# Patient Record
Sex: MALE | Race: WHITE | NOT HISPANIC OR LATINO | Employment: FULL TIME | URBAN - NONMETROPOLITAN AREA
[De-identification: names, ages, dates, MRNs, and addresses within clinical notes are randomized per-mention and may not be internally consistent; named-entity substitution may affect disease eponyms.]

---

## 2017-01-04 ENCOUNTER — AMBULATORY - GICH (OUTPATIENT)
Dept: PHYSICAL THERAPY | Facility: OTHER | Age: 62
End: 2017-01-04

## 2017-01-04 DIAGNOSIS — M25.512 PAIN IN LEFT SHOULDER: ICD-10-CM

## 2017-01-11 ENCOUNTER — HOSPITAL ENCOUNTER (OUTPATIENT)
Dept: PHYSICAL THERAPY | Facility: OTHER | Age: 62
Setting detail: THERAPIES SERIES
End: 2017-01-11

## 2017-01-11 DIAGNOSIS — M25.512 PAIN IN LEFT SHOULDER: ICD-10-CM

## 2017-01-26 ENCOUNTER — HOSPITAL ENCOUNTER (OUTPATIENT)
Dept: PHYSICAL THERAPY | Facility: OTHER | Age: 62
Setting detail: THERAPIES SERIES
End: 2017-01-26

## 2017-02-01 ENCOUNTER — HOSPITAL ENCOUNTER (OUTPATIENT)
Dept: PHYSICAL THERAPY | Facility: OTHER | Age: 62
Setting detail: THERAPIES SERIES
End: 2017-02-01

## 2017-02-28 ENCOUNTER — HOSPITAL ENCOUNTER (EMERGENCY)
Facility: HOSPITAL | Age: 62
Discharge: HOME | End: 2017-02-28
Attending: PHYSICIAN ASSISTANT | Admitting: PHYSICIAN ASSISTANT
Payer: COMMERCIAL

## 2017-02-28 VITALS
TEMPERATURE: 98.1 F | OXYGEN SATURATION: 97 % | SYSTOLIC BLOOD PRESSURE: 154 MMHG | DIASTOLIC BLOOD PRESSURE: 99 MMHG | HEART RATE: 73 BPM | RESPIRATION RATE: 14 BRPM

## 2017-02-28 DIAGNOSIS — R07.89 ATYPICAL CHEST PAIN: ICD-10-CM

## 2017-02-28 LAB
ANION GAP SERPL CALCULATED.3IONS-SCNC: 7 MMOL/L (ref 3–14)
BASOPHILS # BLD AUTO: 0 10E9/L (ref 0–0.2)
BASOPHILS NFR BLD AUTO: 0.5 %
BUN SERPL-MCNC: 19 MG/DL (ref 7–30)
CALCIUM SERPL-MCNC: 8.4 MG/DL (ref 8.5–10.1)
CHLORIDE SERPL-SCNC: 106 MMOL/L (ref 94–109)
CO2 SERPL-SCNC: 28 MMOL/L (ref 20–32)
CREAT SERPL-MCNC: 0.99 MG/DL (ref 0.66–1.25)
DIFFERENTIAL METHOD BLD: NORMAL
EOSINOPHIL # BLD AUTO: 0.2 10E9/L (ref 0–0.7)
EOSINOPHIL NFR BLD AUTO: 2.7 %
ERYTHROCYTE [DISTWIDTH] IN BLOOD BY AUTOMATED COUNT: 13.2 % (ref 10–15)
GFR SERPL CREATININE-BSD FRML MDRD: 78 ML/MIN/1.7M2
GLUCOSE SERPL-MCNC: 124 MG/DL (ref 70–99)
HCT VFR BLD AUTO: 40.1 % (ref 40–53)
HGB BLD-MCNC: 13.5 G/DL (ref 13.3–17.7)
IMM GRANULOCYTES # BLD: 0 10E9/L (ref 0–0.4)
IMM GRANULOCYTES NFR BLD: 0.2 %
LYMPHOCYTES # BLD AUTO: 1.8 10E9/L (ref 0.8–5.3)
LYMPHOCYTES NFR BLD AUTO: 32.9 %
MCH RBC QN AUTO: 29.6 PG (ref 26.5–33)
MCHC RBC AUTO-ENTMCNC: 33.7 G/DL (ref 31.5–36.5)
MCV RBC AUTO: 88 FL (ref 78–100)
MONOCYTES # BLD AUTO: 0.6 10E9/L (ref 0–1.3)
MONOCYTES NFR BLD AUTO: 10.8 %
NEUTROPHILS # BLD AUTO: 2.9 10E9/L (ref 1.6–8.3)
NEUTROPHILS NFR BLD AUTO: 52.9 %
NRBC # BLD AUTO: 0 10*3/UL
NRBC BLD AUTO-RTO: 0 /100
PLATELET # BLD AUTO: 219 10E9/L (ref 150–450)
POTASSIUM SERPL-SCNC: 4 MMOL/L (ref 3.4–5.3)
RBC # BLD AUTO: 4.56 10E12/L (ref 4.4–5.9)
SODIUM SERPL-SCNC: 141 MMOL/L (ref 133–144)
TROPONIN I SERPL-MCNC: NORMAL UG/L (ref 0–0.04)
WBC # BLD AUTO: 5.5 10E9/L (ref 4–11)

## 2017-02-28 PROCEDURE — 80048 BASIC METABOLIC PNL TOTAL CA: CPT | Performed by: PHYSICIAN ASSISTANT

## 2017-02-28 PROCEDURE — 93005 ELECTROCARDIOGRAM TRACING: CPT

## 2017-02-28 PROCEDURE — 85025 COMPLETE CBC W/AUTO DIFF WBC: CPT | Performed by: PHYSICIAN ASSISTANT

## 2017-02-28 PROCEDURE — 36415 COLL VENOUS BLD VENIPUNCTURE: CPT | Performed by: PHYSICIAN ASSISTANT

## 2017-02-28 PROCEDURE — 99285 EMERGENCY DEPT VISIT HI MDM: CPT | Performed by: PHYSICIAN ASSISTANT

## 2017-02-28 PROCEDURE — 99285 EMERGENCY DEPT VISIT HI MDM: CPT | Mod: 25

## 2017-02-28 PROCEDURE — 71020 ZZHC CHEST TWO VIEWS, FRONT/LAT: CPT | Mod: TC

## 2017-02-28 PROCEDURE — 25000132 ZZH RX MED GY IP 250 OP 250 PS 637: Performed by: PHYSICIAN ASSISTANT

## 2017-02-28 PROCEDURE — 93010 ELECTROCARDIOGRAM REPORT: CPT | Performed by: INTERNAL MEDICINE

## 2017-02-28 PROCEDURE — 84484 ASSAY OF TROPONIN QUANT: CPT | Performed by: PHYSICIAN ASSISTANT

## 2017-02-28 RX ORDER — LIDOCAINE 40 MG/G
CREAM TOPICAL
COMMUNITY
End: 2020-06-23

## 2017-02-28 RX ORDER — ASPIRIN 81 MG/1
324 TABLET, CHEWABLE ORAL ONCE
Status: COMPLETED | OUTPATIENT
Start: 2017-02-28 | End: 2017-02-28

## 2017-02-28 RX ORDER — LORATADINE 10 MG/1
10 TABLET, ORALLY DISINTEGRATING ORAL DAILY
Status: ON HOLD | COMMUNITY
End: 2019-12-09

## 2017-02-28 RX ORDER — FLUTICASONE PROPIONATE 50 MCG
2 SPRAY, SUSPENSION (ML) NASAL DAILY
COMMUNITY

## 2017-02-28 RX ADMIN — ASPIRIN 81 MG 324 MG: 81 TABLET ORAL at 16:55

## 2017-02-28 ASSESSMENT — ENCOUNTER SYMPTOMS
NAUSEA: 0
FEVER: 0
VOMITING: 0
ABDOMINAL PAIN: 0
WHEEZING: 0
CHILLS: 0
SORE THROAT: 0
CHEST TIGHTNESS: 0
SHORTNESS OF BREATH: 0
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
## 2017-02-28 NOTE — ED AVS SNAPSHOT
[facility]    [address]    [place] [address]    Phone:  [phone]                                       Delvin Gray   MRN: [id number]    Department[facility]  [facility]   Date of Visit:  2/28/2017           After Visit Summary Signature Page     I have received my discharge instructions, and my questions have been answered. I have discussed any challenges I see with this plan with the nurse or doctor.    ..........................................................................................................................................  Patient/Patient Representative Signature      ..........................................................................................................................................  Patient Representative Print Name and Relationship to Patient    ..................................................               ................................................  Date                                            Time    ..........................................................................................................................................  Reviewed by Signature/Title    ...................................................              ..............................................  Date                                                            Time
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
## 2018-01-03 NOTE — PROGRESS NOTES
Patient Information     Patient Name MRN Sex Delvin Crum [id number] Male 1955      Progress Notes by Brenda Ac at 2017  4:31 PM     Author:  Brenda Ac Service:  (none) Author Type:  PT- Physical Therapy Assistant     Filed:  2017  5:25 PM Date of Service:  2017  4:31 PM Status:  Signed     :  Brenda Ac (PT- Physical Therapy Assistant)            [facility]  Outpatient PT - Daily Note      Date of Service: 2017    Visit 3 of 10     Patient Name: Delvin Gray   YOB: 1955   Referring MD/Provider: Gwendolyn Galvan  Diagnosis: LEFT SHOULDER PAIN, DECREASED ROM   Treatment Diagnosis: left shoulder pain, forced impingement/contusion of supra/infra tendons and bicepital tendonopathy  Insurance: Other: [employer]  Start of Care Date: 2017   Certification Dates: From: 2017  Re-Cert Due: 17      Subjective        Pain Ratin = Mild Pain, (Bothersome, Annoying, Irritating, Nagging) and  5 = Moderate Pain, (Aggravating, Grueling, Upsetting, Frustrating) / Location:  Left shoulder, anterior shoulder  Other:   Delvin reports he is working with smaller boxes now.     Functional Improvement:    Objective    Today's Intervention:    Pulleys stretch into flexion   TRX   Protraction x20    perurbations x20   TBand IR with green tband to fatigue (x35)   ER with red tband to fatigue (x30)  TRX   Wax on/off in chest press x30 seconds each direction    Retraction x20   scaption 2# to fatigue   8# bicep curl B x20   Hands on flat surface of BOSU placed on wall at 90 degrees of flexion with lateral weight shifts into unilateral protraction/retraction x20   Pushups with plus on flat surface of BOSU placed on wall at 90 degrees of flexion x10     Ionto 2.0mL of Dex at bicep tendon Action patch      Home Exercise Program:  Standing Shoulder Row with Anchored Resistance - 20 reps - 2-3 sets - 1x daily  Shoulder  Internal Rotation with Resistance - 20-30 reps - 2-3 sets - 1x daily  Shoulder External Rotation with Anchored Resistance - 20-30 reps - 2-3 sets - 3x weekly  Standing Shoulder Scaption - 20-30 reps - 2-3 sets - 3x weekly  Single Arm Doorway Pec Stretch at 90 Degrees Abduction - 3-5 reps - 2-3 sets - 15 hold - 3x weekly    Assessment    Therapist Assessment:    He does well with TherEx, flared up from work tasks, needs to reduce the strain with lifting less boxes 2 at a time.     Goals:  Patient goal: Work and sleep and do house chores with no pain or limits in 8 weeks.     Functional Short Term Goals (4 weeks):      Patient will have full ROM in all directions and reaches.  Patient will have 5/5 cuff strength.  Patient is to have improved work tolerance of lifting boxes to 50% improved.  Patient will have improved sleep position tolerance with 75% improved.     Functional Long Term Goals (8 weeks):      Patient will be independent in their Home Exercise Program without exacerbation of symptoms.  Patient will have no pain with sleeping  Patient will tolerate all work tasks.  Patient will complete posture correction and lifting techniques to prevent future injury.  Patient is to self-manage symptoms and return to prior level of function in 8 weeks.         Patient participated in goal selection and understand(s) the plan of care: Yes   Patient Potential for Achieving Desired Outcome:  Good, strong and healthy     Response to Intervention: Patient had good response and is in understanding of plan of care and home exercise program at this time.        Plan     Treatment Plan / Targeted Outcomes:  Frequency: 16 visits  Duration of Treatment: 3 months     Planned Interventions:   Home Exercise Program development  Therapeutic Exercise (ROM & Strengthening)  Therapeutic Activities  Manual Therapy  Neuromuscular Re-education  Gait Training  Ultrasound  E-Stim/TENS  Phonophoresis with Ketoprofen  Iontophoresis with  Dexamethasone  Warm/Cold Pack  Vasopneumatic Compression with Cold Therapy ( [employer] )     Plan for next visit: scap retraction work, ionto     Student or PTA has been instructed in and demonstrates skills necessary to carry out above stated treatment plan: Yes     Thank you for your referral to [facility]. Please call with any questions, concerns or comments. [phone]

## 2018-01-03 NOTE — PROGRESS NOTES
Patient Information     Patient Name MRN Sex Delvin Crum [id number] Male 1955      Progress Notes by Mika Lee, PT at 2017  3:38 PM     Author:  Mika Lee PT Service:  (none) Author Type:  PT- Physical Therapist     Filed:  2017  8:24 AM Date of Service:  2017  3:38 PM Status:  Signed     :  Mika Lee PT (PT- Physical Therapist)            [facility]  Outpatient PT - Daily Note      Date of Service: 2017   Visit 2 of 10     Patient Name: Delvin Gray   YOB: 1955   Referring MD/Provider: Gwendolyn Galvan  Diagnosis: LEFT SHOULDER PAIN, DECREASED ROM   Treatment Diagnosis: left shoulder pain, forced impingement/contusion of supra/infra tendons and bicepital tendonopathy  Insurance: Other: [employer]  Start of Care Date: 2017   Certification Dates: From: 2017  Re-Cert Due: 17      Subjective        Pain Ratin = Mild Pain, (Bothersome, Annoying, Irritating, Nagging) and  5 = Moderate Pain, (Aggravating, Grueling, Upsetting, Frustrating) / Location:  Left shoulder, anterior shoulder  Other:   Has been doing ok with work tasks, pain with lifting boxes, especially with 2 boxes.    Functional Improvement:    Objective    Today's Intervention:    Pulleys stretch  TRX   Protraction   perurbations  TBand IR   ER  TRX   Wax on/off in chest press, and in row position  scaption 2# to fatigue  8# bicep curl  Ionto 2.0mL of Dex at bicep tendon. Action patch      Home Exercise Program:    Standing Shoulder Row with Anchored Resistance - 20 reps - 2-3 sets - 1x daily  Shoulder Internal Rotation with Resistance - 20-30 reps - 2-3 sets - 1x daily  Shoulder External Rotation with Anchored Resistance - 20-30 reps - 2-3 sets - 3x weekly  Standing Shoulder Scaption - 20-30 reps - 2-3 sets - 3x weekly  Single Arm Doorway Pec Stretch at 90 Degrees Abduction - 3-5 reps - 2-3 sets - 15 hold - 3x  weekly    Assessment    Therapist Assessment:    He does well with TherEx, flared up from work tasks, needs to reduce the strain with lifting less boxes 2 at a time.     Goals:  Exercises  Standing Shoulder Row with Anchored Resistance - 20 reps - 2-3 sets - 1x daily  Shoulder Internal Rotation with Resistance - 20-30 reps - 2-3 sets - 1x daily  Shoulder External Rotation with Anchored Resistance - 20-30 reps - 2-3 sets - 3x weekly  Standing Shoulder Scaption - 20-30 reps - 2-3 sets - 3x weekly  Single Arm Doorway Pec Stretch at 90 Degrees Abduction - 3-5 reps - 2-3 sets - 15 hold - 3x weekly       Response to Intervention:  Tired in shoulder.       Plan    Treatment Plan / Targeted Outcomes:     Frequency:   16 visits     Duration of Treatment: 3 months    Planned Interventions:    Home Exercise Program development  Therapeutic Exercise (ROM & Strengthening)  Therapeutic Activities  Manual Therapy  Neuromuscular Re-education  Ultrasound  Electrical Stimulation  Phonophoresis with Ketoprofen  Iontophoresis with Dexamethasone  Warm/Cold Pack  Vasopneumatic Device    Plan for next visit:  scap retraction work, ionto    Student or PTA has been instructed in and demonstrates skills necessary to carry out above stated treatment plan: Yes    Thank you for your referral to [facility].  Please call with any questions, concerns or comments.  [phone]

## 2018-02-01 ENCOUNTER — DOCUMENTATION ONLY (OUTPATIENT)
Dept: FAMILY MEDICINE | Facility: OTHER | Age: 63
End: 2018-02-01

## 2018-02-01 RX ORDER — FLUTICASONE PROPIONATE 50 MCG
1 SPRAY, SUSPENSION (ML) NASAL DAILY
Status: ON HOLD | COMMUNITY
Start: 2012-08-30 | End: 2019-12-09 | Stop reason: DRUGHIGH

## 2018-02-01 RX ORDER — NAPROXEN 500 MG/1
500 TABLET ORAL 2 TIMES DAILY WITH MEALS
Status: ON HOLD | COMMUNITY
Start: 2012-08-30 | End: 2019-12-10

## 2018-02-01 RX ORDER — GABAPENTIN 300 MG/1
300 CAPSULE ORAL 3 TIMES DAILY
Status: ON HOLD | COMMUNITY
End: 2019-12-09 | Stop reason: DRUGHIGH

## 2018-02-01 RX ORDER — OXYCODONE AND ACETAMINOPHEN 5; 325 MG/1; MG/1
1-2 TABLET ORAL EVERY 6 HOURS PRN
COMMUNITY
Start: 2015-07-24 | End: 2019-09-16

## 2018-02-01 RX ORDER — POLYETHYLENE GLYCOL 3350 17 G/17G
17 POWDER, FOR SOLUTION ORAL DAILY PRN
COMMUNITY
Start: 2012-08-30 | End: 2024-03-21

## 2018-02-01 RX ORDER — ROPINIROLE 1 MG/1
1 TABLET, FILM COATED ORAL DAILY PRN
COMMUNITY

## 2018-02-01 RX ORDER — LORATADINE 10 MG/1
10 TABLET ORAL DAILY
COMMUNITY
Start: 2012-08-30

## 2018-04-08 NOTE — DISCHARGE INSTRUCTIONS
[facility] Hospitalist Progress Note    Date of Service: 4/7/2018    Chief Complaint  60 y.o. male admitted 3/16/2018 with ongoing cellulitis of bilateral lower extremities with a history of chronic venous stasis and lymphedema. He had acute respiratory distress in ER. S/p ICU stay with MV dependence this hospitalization with treatment for pneumonia and cellulitis.     Interval Problem Update  Patient seen and evaluated on rounds  Feels well  Wants opioid escalated, counseled and educated regarding risks  Remains non motivated. Lack of medical compliance.   Non compliant with nursing intervention, medications use  Counseled and educated.   SW arranging home BIPAP, RUSSELL for caregiver  Weaning Oxycodone, continue current dose for now  Advised if HCO3 improved then can be transitioned back to baseline  Overall difficult disposition    Consultants/Specialty  Pulmonology   Palliative care    Disposition  Refused by LTACs  Refused by SNFs  [facility] to do RUSSELL for home caregiver  Arrange home health care  Arrange BIPAP for use at home  Once above arranged can discharge home  Can transfer to medical RNF while disposition is awaited, no telemetry needs at this time      Review of Systems   Constitutional: Positive for malaise/fatigue. Negative for chills, diaphoresis, fever and weight loss.   HENT: Negative for hearing loss and tinnitus.    Eyes: Negative for blurred vision and double vision.   Respiratory: Positive for shortness of breath. Negative for cough, hemoptysis, sputum production and wheezing.    Cardiovascular: Positive for leg swelling. Negative for chest pain and claudication.   Gastrointestinal: Positive for constipation. Negative for abdominal pain, blood in stool, diarrhea, heartburn, melena, nausea and vomiting.   Genitourinary: Negative for dysuria, flank pain, frequency, hematuria and urgency.   Musculoskeletal: Positive for joint pain. Negative for back pain, falls, myalgias and neck pain.   Skin: Positive for  Your cardiac work up today was normal. I believe the cause of your chest pain is likely due to the smoke exposure you had on 3/16. Your symptoms should continue to improve from here. Please return here with any new or worsening symptoms.      *CHEST PAIN, UNCERTAIN CAUSE    Based on your exam today, the exact cause of your chest pain is not certain. Your condition does not seem serious at this time, and your pain does not appear to be coming from your heart. However, sometimes the signs of a serious problem take more time to appear. Therefore, watch for the warning signs listed below.  HOME CARE:  1. Rest today and avoid strenuous activity.  2. Take any prescribed medicine as directed.  FOLLOW UP with your doctor in 1-3 days.   GET PROMPT MEDICAL ATTENTION if any of the following occur:    A change in the type of pain: if it feels different, becomes more severe, lasts longer, or begins to spread into your shoulder, arm, neck, jaw or back    Shortness of breath or increased pain with breathing    Weakness, dizziness, or fainting    Cough with blood or dark colored sputum (phlegm)    Fever over 101  F (38.3  C)    Swelling, pain or redness in one leg    [id number] [address], [place] [address]. All rights reserved. This information is not intended as a substitute for professional medical care. Always follow your healthcare professional's instructions.     rash. Negative for itching.   Neurological: Positive for weakness. Negative for dizziness and headaches.   Psychiatric/Behavioral: Negative for depression. The patient is nervous/anxious.       Physical Exam  Laboratory/Imaging   Hemodynamics  Temp (24hrs), Av.4 °C (97.5 °F), Min:36.1 °C (96.9 °F), Max:36.7 °C (98 °F)   Temperature: 36.7 °C (98 °F)  Pulse  Av.6  Min: 54  Max: 116    Blood Pressure: (!) 94/52      Respiratory      Respiration: 18, Pulse Oximetry: 91 %     Work Of Breathing / Effort: Mild  RUL Breath Sounds: Clear, RML Breath Sounds: Diminished, RLL Breath Sounds: Diminished, LARY Breath Sounds: Clear, LLL Breath Sounds: Diminished    Fluids    Intake/Output Summary (Last 24 hours) at 18  Last data filed at 18 1700   Gross per 24 hour   Intake              860 ml   Output             2300 ml   Net            -1440 ml       Nutrition  Orders Placed This Encounter   Procedures   • DIET ORDER     Standing Status:   Standing     Number of Occurrences:   1     Order Specific Question:   Diet:     Answer:   Diabetic [3]     Order Specific Question:   Texture/Fiber modifications:     Answer:   Dysphagia 3(Mechanical Soft)specify fluid consistency(question 6) [3]     Order Specific Question:   Consistency/Fluid modifications:     Answer:   Thin Liquids [3]     Comments:   NO straws     Physical Exam   Constitutional: He is oriented to person, place, and time. He appears well-developed. No distress.   Morbidly obese, Body mass index is 42.97 kg/m².   HENT:   Head: Normocephalic.   Mouth/Throat: Oropharynx is clear and moist. No oropharyngeal exudate.   Eyes: Conjunctivae are normal. Pupils are equal, round, and reactive to light. No scleral icterus.   Neck: No JVD present.   Cardiovascular: Normal rate, regular rhythm and normal heart sounds.  Exam reveals no gallop and no friction rub.    No murmur heard.  Tachycardia   Pulmonary/Chest: No stridor. No respiratory distress. He has  decreased breath sounds. He has no wheezes. He has no rhonchi. He has no rales. He exhibits no crepitus.   Diminished in bases. Poor inspiratory effort   Abdominal: Soft. Bowel sounds are normal. He exhibits distension. There is no tenderness. There is no rebound and no guarding.   Musculoskeletal: He exhibits edema (2+ LE). He exhibits no tenderness or deformity.   Neurological: He is alert and oriented to person, place, and time. No cranial nerve deficit.   Skin: Skin is warm and dry. He is not diaphoretic.   Scaly skin on head  Lower extremity wounds  B/L Severe stasis dermatitis   Psychiatric: He has a normal mood and affect. His behavior is normal. Judgment and thought content normal.   Vitals reviewed.          Recent Labs      04/05/18   0306  04/06/18   0239   SODIUM  138  137   POTASSIUM  4.4  4.8   CHLORIDE  91*  91*   CO2  39*  39*   GLUCOSE  135*  139*   BUN  23*  30*   CREATININE  0.62  0.81   CALCIUM  9.7  9.5                      Assessment/Plan     * Acute on chronic respiratory failure with hypoxia and hypercapnia (CMS-HCC)- (present on admission)   Assessment & Plan    Chronic issues, nearly baseline now   Intubated 3-20 extubated 3/25/2018 this hospital stay  Morbid obesity, Body mass index is 42.97 kg/m².  Obesity hypoventilation syndrome / ALIREZA is contributing   Suspect underlying pulmonary hypertension / RV dysfunction  Underlying history of COPD  Non compliance with intervention / medical recommendations & therapy complicates care  Underlying debility with poor inspiratory effort  Narcotic dependence further decreasing inspiratory efforts    Plan:  At least 8 hours of BIPAP therapy at night  Recommend BIPAP twice daily for 1 hours (11am-12 pm) & (3pm-4pm)  Aggressive nursing interventions, RT protocol  Ambulate as able  Continue Lasix, PO 40 daily  Monitor HCO3 on chemistries   Symbicort / Spiriva  Wean off opioids as these are not helping him  Nocturnal desaturation study done, have pulm  request outpatient BIPAP  Arrange BIPAP for home    Poor prognosis 2/2 multiple factors listed above most importantly 2/2 patient's personal non compliance and lack of motivation         Severe protein-calorie malnutrition (CMS-HCC)   Assessment & Plan    Evident from SC fat loss and pre albumin levels  Optimize nutrition        Bilateral edema of lower extremity- (present on admission)   Assessment & Plan    Chronic lymphedema and stasis dermatitis  Continue po Lasix  Monitor intake output and fluid status  Ambulation as able  Significant contribution from debility        Diabetes type 2, controlled (CMS-HCC)- (present on admission)   Assessment & Plan    No apparent manifestations  Metformin 1000 mg BID  ASA 81, Atorvastatin 40 mg (check lipid profile)        Non compliance w medication regimen- (present on admission)   Assessment & Plan    Reinforced compliance   Patient has also been noncompliant with BIPAP as he does not like the feeling  Counseled and educated again  Improving compliance now  Will continue to reinforce care        Narcotic addiction (CMS-HCC)- (present on admission)   Assessment & Plan    Mnimize sedating agents  Wean as not helping respiratory status        Stasis dermatitis- (present on admission)   Assessment & Plan    With MSSA cellulitis this hospital course   Completed antibiotic course   Continue wound care / Skin care  Aggressive nursing interventions        Hypothyroidism- (present on admission)   Assessment & Plan    Synthroid, TSH wnl 03/2018        COPD (chronic obstructive pulmonary disease) (CMS-HCC)- (present on admission)   Assessment & Plan    RT protocol  Pulm on board  No acute exacerbation at this time        HTN (hypertension)- (present on admission)   Assessment & Plan    Stop amlodipine as this will contribute to worsening edema  Lisinopril 20 mg  Titrate therapy as clinically appropriate        Morbid obesity (CMS-HCC)- (present on admission)   Assessment & Plan    Body  mass index is 48.02 kg/m².   Complicated by obstructive sleep apnea and obesity hypoventilation syndrome  Encouraged weight loss          Quality-Core Measures   Reviewed items::  Labs reviewed and Medications reviewed  DVT prophylaxis pharmacological::  Enoxaparin (Lovenox)  Antibiotics:  Treating active infection/contamination beyond 24 hours perioperative coverage

## 2018-05-03 DIAGNOSIS — M19.91 PRIMARY OSTEOARTHRITIS: Primary | ICD-10-CM

## 2018-05-31 ENCOUNTER — HOSPITAL ENCOUNTER (OUTPATIENT)
Dept: PHYSICAL THERAPY | Facility: OTHER | Age: 63
Setting detail: THERAPIES SERIES
End: 2018-05-31
Attending: NURSE PRACTITIONER
Payer: COMMERCIAL

## 2018-05-31 PROCEDURE — 97110 THERAPEUTIC EXERCISES: CPT | Mod: GP | Performed by: PHYSICAL THERAPIST

## 2018-05-31 PROCEDURE — 40000185 ZZHC STATISTIC PT OUTPT VISIT: Performed by: PHYSICAL THERAPIST

## 2018-05-31 PROCEDURE — 97161 PT EVAL LOW COMPLEX 20 MIN: CPT | Mod: GP | Performed by: PHYSICAL THERAPIST

## 2018-05-31 NOTE — PROGRESS NOTES
05/31/18 1334   General Information   Type of Visit Initial OP Ortho PT Evaluation   Start of Care Date 05/31/18   Referring Physician Dr. Jen Galvan   Patient/Family Goals Statement Decrease neck pain and shoulder pain   Insurance Type Other  ([facility])   Insurance Comments/Visits Authorized [facility] authorized 14 visits   Medical Diagnosis OA neck   Surgical/Medical history reviewed Yes   Presentation and Etiology   Pertinent history of current problem (include personal factors and/or comorbidities that impact the POC) low   Functional Limitations perform required work activities   Symptom Location lower neck/upper thoracic, upper neck and occiput, denies headaches, reports upper neck cramping.   How/Where did it occur With repetition/overuse   Onset date of current episode/exacerbation 11/01/17   Chronicity Recurrent   Pain rating (0-10 point scale) Best (/10);Worst (/10)   Best (/10) 2   Worst (/10) 7   Pain quality C. Aching;G. Cramping;B. Dull   Frequency of pain/symptoms B. Intermittent   Pain/symptoms exacerbated by C. Lifting;H. Overhead reach;G. Certain positions;L. Work tasks   Prior Level of Function   Functional Level Prior Comment Since November has had more ongoing neck pain, left shoulder pain, bilateral forearm and hand pain.  Prior to that had mild symptoms.   Fall Risk Screen   Fall screen completed by PT   Have you fallen 2 or more times in the past year? No   Have you fallen and had an injury in the past year? No   Is patient a fall risk? No   Cervical Spine   Observation fair to poor neck posture, forward head postion by 10 degrees   Posture Cervical spine ROM measured with CROM   Cervical Flexion ROM 44   Cervical Extension ROM 60   Cervical Right Side Bending ROM 30   Cervical Left Side Bending ROM 34   Cervical Right Rotation ROM 62   Cervical Left Rotation ROM 50   Shoulder AROM Screen limited abduction to 130 degrees bilaterally   Shoulder Shrug (C2-C4) Strength 5/5   Shoulder Abd (C5)  Strength 4/5   Shoulder Add (C7) Strength 5/5   Shoulder ER (C5, C6) Strength left 4/5, right 5/5   Shoulder IR (C5, C6) Strength 5/5   Elbow Flexion (C5, C6) Strength 5/5   Elbow Extension (C7) Strength 5/5   Wrist Extension (C6) Strength left 4/5, right 5/5   Wrist Flexion (C7) Strength left 4/5, right 5/5   Shoulder/Wrist/Hand Strength Comments  tested with hand dynamometer:  dominant right hand weakness observed 58 lbs, left hand weakness observed 48 lbs.  History of carpal tunnel syndrome bilaterally.   Planned Therapy Interventions   Planned Therapy Interventions neuromuscular re-education;manual therapy;strengthening;stretching;joint mobilization   Planned Modality Interventions   Planned Modality Interventions Cryotherapy;Electrical stimulation;Ultrasound;Traction;Iontophoresis   Clinical Impression   Criteria for Skilled Therapeutic Interventions Met yes, treatment indicated   PT Diagnosis Neck and left shoulder pain, decreased ROM, shoulder and hand weakness.   Functional limitations due to impairments Difficulty grasping, lifting, carrying, reaching over shoulder height    Clinical Presentation Evolving/Changing   Clinical Decision Making (Complexity) Low complexity   Therapy Frequency 2 times/Week   Predicted Duration of Therapy Intervention (days/wks) 8 weeks   Risk & Benefits of therapy have been explained Yes   Patient, Family & other staff in agreement with plan of care Yes   Ortho Goal 1   Goal Identifier Sleep   Goal Description Sleep through the night with no sleep disruption due to pain.   Target Date 07/26/18   Ortho Goal 2   Goal Identifier Packing boxes at work   Goal Description Patient will tolerate 6-8 hours of work rating pain less then 3/10 consistently.   Target Date 07/26/18   Ortho Goal 3   Goal Identifier HEP   Goal Description Independent and compliant with advanced HEP   Target Date 07/26/18   Total Evaluation Time   Total Evaluation Time 40

## 2018-06-04 ENCOUNTER — HOSPITAL ENCOUNTER (OUTPATIENT)
Dept: PHYSICAL THERAPY | Facility: OTHER | Age: 63
Setting detail: THERAPIES SERIES
End: 2018-06-04
Payer: COMMERCIAL

## 2018-06-04 PROCEDURE — 97012 MECHANICAL TRACTION THERAPY: CPT | Mod: GP | Performed by: PHYSICAL THERAPIST

## 2018-06-04 PROCEDURE — 97112 NEUROMUSCULAR REEDUCATION: CPT | Mod: GP | Performed by: PHYSICAL THERAPIST

## 2018-06-04 PROCEDURE — 97110 THERAPEUTIC EXERCISES: CPT | Mod: GP | Performed by: PHYSICAL THERAPIST

## 2018-06-04 PROCEDURE — 40000185 ZZHC STATISTIC PT OUTPT VISIT: Performed by: PHYSICAL THERAPIST

## 2018-06-06 ENCOUNTER — HOSPITAL ENCOUNTER (OUTPATIENT)
Dept: PHYSICAL THERAPY | Facility: OTHER | Age: 63
Setting detail: THERAPIES SERIES
End: 2018-06-06
Payer: COMMERCIAL

## 2018-06-06 PROCEDURE — 40000185 ZZHC STATISTIC PT OUTPT VISIT: Performed by: PHYSICAL THERAPIST

## 2018-06-06 PROCEDURE — 97140 MANUAL THERAPY 1/> REGIONS: CPT | Mod: GP | Performed by: PHYSICAL THERAPIST

## 2018-06-06 PROCEDURE — 97110 THERAPEUTIC EXERCISES: CPT | Mod: GP | Performed by: PHYSICAL THERAPIST

## 2018-06-11 ENCOUNTER — HOSPITAL ENCOUNTER (OUTPATIENT)
Dept: PHYSICAL THERAPY | Facility: OTHER | Age: 63
Setting detail: THERAPIES SERIES
End: 2018-06-11
Payer: COMMERCIAL

## 2018-06-11 PROCEDURE — 97140 MANUAL THERAPY 1/> REGIONS: CPT | Mod: GP | Performed by: PHYSICAL THERAPIST

## 2018-06-11 PROCEDURE — 40000185 ZZHC STATISTIC PT OUTPT VISIT: Performed by: PHYSICAL THERAPIST

## 2018-06-11 PROCEDURE — 97110 THERAPEUTIC EXERCISES: CPT | Mod: GP | Performed by: PHYSICAL THERAPIST

## 2018-06-11 PROCEDURE — 97012 MECHANICAL TRACTION THERAPY: CPT | Mod: GP | Performed by: PHYSICAL THERAPIST

## 2018-06-21 ENCOUNTER — HOSPITAL ENCOUNTER (OUTPATIENT)
Dept: PHYSICAL THERAPY | Facility: OTHER | Age: 63
Setting detail: THERAPIES SERIES
End: 2018-06-21
Payer: COMMERCIAL

## 2018-06-21 PROCEDURE — 97140 MANUAL THERAPY 1/> REGIONS: CPT | Mod: GP,XU

## 2018-06-21 PROCEDURE — 40000185 ZZHC STATISTIC PT OUTPT VISIT

## 2018-06-21 PROCEDURE — 97012 MECHANICAL TRACTION THERAPY: CPT | Mod: GP

## 2018-06-21 PROCEDURE — 97110 THERAPEUTIC EXERCISES: CPT | Mod: GP

## 2018-06-28 ENCOUNTER — HOSPITAL ENCOUNTER (OUTPATIENT)
Dept: PHYSICAL THERAPY | Facility: OTHER | Age: 63
Setting detail: THERAPIES SERIES
End: 2018-06-28
Attending: NURSE PRACTITIONER
Payer: COMMERCIAL

## 2018-06-28 PROCEDURE — 97140 MANUAL THERAPY 1/> REGIONS: CPT | Mod: GP,XU

## 2018-06-28 PROCEDURE — 97012 MECHANICAL TRACTION THERAPY: CPT | Mod: GP

## 2018-06-28 PROCEDURE — 40000185 ZZHC STATISTIC PT OUTPT VISIT

## 2018-06-28 PROCEDURE — 97110 THERAPEUTIC EXERCISES: CPT | Mod: GP

## 2018-07-02 ENCOUNTER — HOSPITAL ENCOUNTER (OUTPATIENT)
Dept: PHYSICAL THERAPY | Facility: OTHER | Age: 63
Setting detail: THERAPIES SERIES
End: 2018-07-02
Payer: COMMERCIAL

## 2018-07-02 PROCEDURE — 40000185 ZZHC STATISTIC PT OUTPT VISIT: Performed by: PHYSICAL THERAPIST

## 2018-07-02 PROCEDURE — 97012 MECHANICAL TRACTION THERAPY: CPT | Mod: GP | Performed by: PHYSICAL THERAPIST

## 2018-07-02 PROCEDURE — 97140 MANUAL THERAPY 1/> REGIONS: CPT | Mod: GP | Performed by: PHYSICAL THERAPIST

## 2018-07-02 PROCEDURE — 97110 THERAPEUTIC EXERCISES: CPT | Mod: GP | Performed by: PHYSICAL THERAPIST

## 2018-07-03 ENCOUNTER — HOSPITAL ENCOUNTER (OUTPATIENT)
Dept: PHYSICAL THERAPY | Facility: OTHER | Age: 63
Setting detail: THERAPIES SERIES
End: 2018-07-03
Attending: NURSE PRACTITIONER
Payer: COMMERCIAL

## 2018-07-03 PROCEDURE — 97012 MECHANICAL TRACTION THERAPY: CPT | Mod: GP | Performed by: PHYSICAL THERAPIST

## 2018-07-03 PROCEDURE — 97110 THERAPEUTIC EXERCISES: CPT | Mod: GP | Performed by: PHYSICAL THERAPIST

## 2018-07-03 PROCEDURE — 40000185 ZZHC STATISTIC PT OUTPT VISIT: Performed by: PHYSICAL THERAPIST

## 2018-07-03 PROCEDURE — 97140 MANUAL THERAPY 1/> REGIONS: CPT | Mod: GP | Performed by: PHYSICAL THERAPIST

## 2018-07-09 ENCOUNTER — HOSPITAL ENCOUNTER (OUTPATIENT)
Dept: PHYSICAL THERAPY | Facility: OTHER | Age: 63
Setting detail: THERAPIES SERIES
End: 2018-07-09
Attending: NURSE PRACTITIONER
Payer: COMMERCIAL

## 2018-07-09 PROCEDURE — 97012 MECHANICAL TRACTION THERAPY: CPT | Mod: GP | Performed by: PHYSICAL THERAPIST

## 2018-07-09 PROCEDURE — 40000185 ZZHC STATISTIC PT OUTPT VISIT: Performed by: PHYSICAL THERAPIST

## 2018-07-09 PROCEDURE — 97110 THERAPEUTIC EXERCISES: CPT | Mod: GP | Performed by: PHYSICAL THERAPIST

## 2018-07-09 PROCEDURE — 97112 NEUROMUSCULAR REEDUCATION: CPT | Mod: GP | Performed by: PHYSICAL THERAPIST

## 2018-07-18 ENCOUNTER — APPOINTMENT (OUTPATIENT)
Dept: GENERAL RADIOLOGY | Facility: OTHER | Age: 63
End: 2018-07-18
Attending: FAMILY MEDICINE
Payer: OTHER MISCELLANEOUS

## 2018-07-18 ENCOUNTER — HOSPITAL ENCOUNTER (EMERGENCY)
Facility: OTHER | Age: 63
Discharge: HOME | End: 2018-07-18
Attending: FAMILY MEDICINE | Admitting: FAMILY MEDICINE
Payer: OTHER MISCELLANEOUS

## 2018-07-18 VITALS
HEIGHT: 65 IN | DIASTOLIC BLOOD PRESSURE: 92 MMHG | TEMPERATURE: 98.5 F | OXYGEN SATURATION: 94 % | WEIGHT: 227 LBS | SYSTOLIC BLOOD PRESSURE: 165 MMHG | BODY MASS INDEX: 37.82 KG/M2 | RESPIRATION RATE: 16 BRPM | HEART RATE: 78 BPM

## 2018-07-18 DIAGNOSIS — S61.218A LACERATION OF INDEX FINGER WITHOUT FOREIGN BODY WITHOUT DAMAGE TO NAIL, UNSPECIFIED LATERALITY, INITIAL ENCOUNTER: ICD-10-CM

## 2018-07-18 LAB
ANION GAP SERPL CALCULATED.3IONS-SCNC: 8 MMOL/L (ref 3–14)
BUN SERPL-MCNC: 22 MG/DL (ref 7–25)
CALCIUM SERPL-MCNC: 9.4 MG/DL (ref 8.6–10.3)
CHLORIDE SERPL-SCNC: 106 MMOL/L (ref 98–107)
CO2 SERPL-SCNC: 26 MMOL/L (ref 21–31)
CREAT SERPL-MCNC: 1.11 MG/DL (ref 0.7–1.3)
GFR SERPL CREATININE-BSD FRML MDRD: 68 ML/MIN/1.7M2
GLUCOSE SERPL-MCNC: 94 MG/DL (ref 70–105)
POTASSIUM SERPL-SCNC: 4.3 MMOL/L (ref 3.5–5.1)
SODIUM SERPL-SCNC: 140 MMOL/L (ref 134–144)

## 2018-07-18 PROCEDURE — 73140 X-RAY EXAM OF FINGER(S): CPT | Mod: LT

## 2018-07-18 PROCEDURE — 25000125 ZZHC RX 250: Performed by: FAMILY MEDICINE

## 2018-07-18 PROCEDURE — 12002 RPR S/N/AX/GEN/TRNK2.6-7.5CM: CPT | Mod: Z6 | Performed by: FAMILY MEDICINE

## 2018-07-18 PROCEDURE — 90471 IMMUNIZATION ADMIN: CPT | Performed by: FAMILY MEDICINE

## 2018-07-18 PROCEDURE — 25000132 ZZH RX MED GY IP 250 OP 250 PS 637: Performed by: FAMILY MEDICINE

## 2018-07-18 PROCEDURE — 12002 RPR S/N/AX/GEN/TRNK2.6-7.5CM: CPT | Performed by: FAMILY MEDICINE

## 2018-07-18 PROCEDURE — 99284 EMERGENCY DEPT VISIT MOD MDM: CPT | Mod: Z6 | Performed by: FAMILY MEDICINE

## 2018-07-18 PROCEDURE — 99284 EMERGENCY DEPT VISIT MOD MDM: CPT | Mod: 25 | Performed by: FAMILY MEDICINE

## 2018-07-18 PROCEDURE — 90715 TDAP VACCINE 7 YRS/> IM: CPT | Performed by: FAMILY MEDICINE

## 2018-07-18 PROCEDURE — 80048 BASIC METABOLIC PNL TOTAL CA: CPT | Performed by: FAMILY MEDICINE

## 2018-07-18 PROCEDURE — 25000128 H RX IP 250 OP 636: Performed by: FAMILY MEDICINE

## 2018-07-18 RX ORDER — LIDOCAINE HYDROCHLORIDE 20 MG/ML
40 INJECTION, SOLUTION INFILTRATION; PERINEURAL ONCE
Status: COMPLETED | OUTPATIENT
Start: 2018-07-18 | End: 2018-07-18

## 2018-07-18 RX ORDER — CLONIDINE HYDROCHLORIDE 0.1 MG/1
0.1 TABLET ORAL ONCE
Status: COMPLETED | OUTPATIENT
Start: 2018-07-18 | End: 2018-07-18

## 2018-07-18 RX ORDER — CLONIDINE HYDROCHLORIDE 0.1 MG/1
0.1 TABLET ORAL ONCE
Status: DISCONTINUED | OUTPATIENT
Start: 2018-07-18 | End: 2018-07-18

## 2018-07-18 RX ADMIN — CLONIDINE HYDROCHLORIDE 0.1 MG: 0.1 TABLET ORAL at 20:12

## 2018-07-18 RX ADMIN — TETANUS TOXOID, REDUCED DIPHTHERIA TOXOID AND ACELLULAR PERTUSSIS VACCINE, ADSORBED 0.5 ML: 5; 2.5; 8; 8; 2.5 SUSPENSION INTRAMUSCULAR at 20:12

## 2018-07-18 RX ADMIN — LIDOCAINE HYDROCHLORIDE 40 MG: 20 INJECTION, SOLUTION EPIDURAL; INFILTRATION; INTRACAUDAL; PERINEURAL at 20:13

## 2018-07-18 NOTE — ED TRIAGE NOTES
Pt comes in after smashing his 2nd finger of his left hand at work. Laceration across medial knuckle.    Lata Liz RN on 7/18/2018 at 2:21 PM

## 2018-07-18 NOTE — ED AVS SNAPSHOT
[facility]    [address]    [place] [address]    Phone:  [phone]    Fax:  [phone]                                       Delvin Gray   MRN: [id number]    Department:  [facility]   Date of Visit:  7/18/2018           After Visit Summary Signature Page     I have received my discharge instructions, and my questions have been answered. I have discussed any challenges I see with this plan with the nurse or doctor.    ..........................................................................................................................................  Patient/Patient Representative Signature      ..........................................................................................................................................  Patient Representative Print Name and Relationship to Patient    ..................................................               ................................................  Date                                            Time    ..........................................................................................................................................  Reviewed by Signature/Title    ...................................................              ..............................................  Date                                                            Time

## 2018-07-18 NOTE — ED AVS SNAPSHOT
"     Delvin Gray #[id number] (CSN: [id number])  (62 year old M)  (Adm: 18)     [id number]               [facility]: [phone]            Patient Demographics     Patient Name Sex          Age SSN Address Phone    Delvin Gray Male 1955 (62 year old) [id number] [facility] [address] [phone] (Home)  [phone] (Mobile)      PCP and Center    Primary Care Provider  Phone [place]     Jen Galvan MADHU [phone] [facility] 554*        Emergency Contact(s)     Name Relation Home Work Mobile    ifrah briones Relative [phone]      Admission Information     Attending Provider Admitting Provider Admission Type Admission Date/Time    Joe Colin MD  Emergency 18  1551    Discharge Date Hospital Service Auth/Cert Status Service Area     Emergency Medicine [facility]    Unit Room/Bed Admission Status        EMERGENCY DEPT ED08/ED08 Admission (Confirmed)       Admission     Complaint    None      Hospital Account     Name Acct ID Class Status Primary Coverage    Delvin Gray [id number] Emergency Open None            Guarantor Account (for Hospital Account #[id number])     Name Relation to Pt Service Area Active? Acct Type    [id number] Self FCS Yes Worker's Compensation    Address Phone          [address] [place] [address] [phone](H)              Coverage Information (for Hospital Account #[id number])     Not on file                                                INTERAGENCY TRANSFER FORM - PHYSICIAN ORDERS   2018                       [facility]: [phone]            Attending Provider: Joe Colin MD     Allergies:  Seasonal Allergies    Infection:  None   Service:  EMERGENCY ME    Ht:  1.651 m (5' 5\")   Wt:  103 kg (227 lb)   Admission Wt:  103 kg (227 lb)    BMI:  " 37.77 kg/m 2   BSA:  2.17 m 2            ED Clinical Impression     Diagnosis Description Comment Added By Time Added    Laceration of index finger without foreign body without damage to nail, unspecified laterality, initial encounter [S61.218A] Laceration of index finger without foreign body without damage to nail, unspecified laterality, initial encounter [S61.218E]  Joe Colin MD 7/18/2018  8:43 PM      Hospital Problems as of 7/18/2018     None      Non-Hospital Problems as of 7/18/2018              Priority Class Noted    Environmental allergies Medium  8/30/2012    Obesity Medium  8/30/2012    Chronic hepatitis B virus infection (H) Medium  8/30/2012      Code Status History     This patient does not have a recorded code status. Please follow your organizational policy for patients in this situation.      Current Code Status     This patient does not have a recorded code status. Please follow your organizational policy for patients in this situation.         Medication Review      UNREVIEWED medications. Ask your doctor about these medications        Dose / Directions Comments    artificial tears Oint ophthalmic ointment        At Bedtime   Refills:  0        carboxymethylcellulose 1 % ophthalmic solution   Commonly known as:  CELLUVISC/REFRESH LIQUIGEL        Dose:  1 drop   1 drop 3 times daily   Refills:  0        * fluticasone 50 MCG/ACT spray   Commonly known as:  FLONASE        Dose:  1 spray   Spray 1 spray into both nostrils daily   Refills:  0        * fluticasone 50 MCG/ACT spray   Commonly known as:  FLONASE        Dose:  1 spray   Spray 1 spray into both nostrils daily   Refills:  0        * GABAPENTIN PO        Take by mouth 3 times daily Pt takes 400 mg in am, 400 mg in elin, and 1200 mg at hs   Refills:  0        * gabapentin 300 MG capsule   Commonly known as:  NEURONTIN        Dose:  300 mg   Take 300 mg by mouth 3 times daily   Refills:  0        hypromellose 0.3 % Soln ophthalmic  solution   Commonly known as:  GENTEAL        Dose:  1 drop   1 drop every hour as needed for dry eyes   Refills:  0        lidocaine 4 % Crea cream   Commonly known as:  LMX4        Apply topically once as needed for mild pain   Refills:  0        * loratadine 10 MG ODT tab   Commonly known as:  CLARITIN REDITABS        Dose:  10 mg   Take 10 mg by mouth daily   Refills:  0        * loratadine 10 MG tablet   Commonly known as:  CLARITIN        Dose:  10 mg   Take 10 mg by mouth daily   Refills:  0        * NAPROXEN PO        Dose:  500 mg   Take 500 mg by mouth 2 times daily (with meals)   Refills:  0        * naproxen 500 MG tablet   Commonly known as:  NAPROSYN        Dose:  500 mg   Take 500 mg by mouth 2 times daily (with meals)   Refills:  0        * OMEPRAZOLE PO        Dose:  20 mg   Take 20 mg by mouth 2 times daily (before meals)   Refills:  0        * omeprazole 20 MG CR capsule   Commonly known as:  priLOSEC        Dose:  20 mg   Take 20 mg by mouth daily Before a meal   Refills:  0        oxyCODONE-acetaminophen 5-325 MG per tablet   Commonly known as:  PERCOCET        Dose:  1-2 tablet   Take 1-2 tablets by mouth every 6 hours as needed for pain Max acetaminophen dose: 4000mg in 24 hrs.   Refills:  0        polyethylene glycol powder   Commonly known as:  MIRALAX/GLYCOLAX        Dose:  17 g   Take 17 g by mouth daily as needed for constipation   Refills:  0        * ROPINIROLE HCL PO        Dose:  1 mg   Take 1 mg by mouth At Bedtime   Refills:  0        * rOPINIRole 1 MG tablet   Commonly known as:  REQUIP        Dose:  1 mg   Take 1 mg by mouth 2 times daily   Refills:  0        TRAMADOL HCL PO        Dose:  50 mg   Take 50 mg by mouth every 6 hours as needed for moderate to severe pain   Refills:  0        * Notice:  This list has 12 medication(s) that are the same as other medications prescribed for you. Read the directions carefully, and ask your doctor or other care provider to review them with  you.              Further instructions from your care team          * LACERATION (All Closures)  A laceration is a cut through the skin. This will usually require stitches (sutures) or staples if it is deep. Minor cuts may be treated with a tape closure ( Steri-Strips ) or Dermabond skin glue.       HOME CARE:  PAIN MEDICINE: You may use acetaminophen (Tylenol) 650-1000 mg every 6 hours or ibuprofen (Motrin, Advil) 600 mg every 6-8 hours with food to control pain, if you are able to take these medicines. [NOTE: If you have chronic liver or kidney disease or ever had a stomach ulcer or GI bleeding, talk with your doctor before using these medicines.]  EXTREMITY, FACE or TRUNK WOUNDS:    Keep the wound clean and dry. If a bandage was applied and it becomes wet or dirty, replace it. Otherwise, leave it in place for the first 24 hours.    If stitches or staples were used, clean the wound daily. Protect the wound from sunlight and tanning lamps.    After removing the bandage, wash the area with soap and water. Use a wet cotton swab (Q tip) to loosen and remove any blood or crust that forms.    After cleaning, apply a thin layer of Polysporin or Bacitracin ointment. This will keep the wound clean and make it easier to remove the stitches or staples. Reapply a fresh bandage.    You may remove the bandage to shower as usual after the first 24 hours, but do not soak the area in water (no swimming) until the stitches or staples are removed.    If Steri-Strips were used, keep the area clean and dry. If it becomes wet, blot it dry with a towel. It is okay to take a brief shower, but avoid scrubbing the area.    If Dermabond skin adhesive was used, do not scratch, rub or pick at the adhesive film. Do not place tape directly over the film. Do not apply liquid, ointment or creams to the wound while the film is in place. Do not clean the wound with peroxide and do not apply ointments. Avoid activities that cause heavy sweating  until the film has fallen off. Protect the wound from prolonged exposure to sunlight or tanning lamps. You may shower as usual but do not soak the wound in water (no baths or swimming). The film will fall off by itself in 5-10 days.  SCALP WOUNDS: During the first two days, you may carefully rinse your hair in the shower to remove blood, glass or dirt particles. After two days, you may shower and shampoo your hair normally. Do not soak your scalp in the tub or go swimming until the stitches or staples have been removed.  MOUTH WOUNDS: Eat soft foods to reduce pain. If the cut is inside of your mouth, clean by rinsing after each meal and at bedtime with a mixture of equal parts water and Hydrogen Peroxide (do not swallow!). Or, you can use a cotton swab to directly apply Hydrogen Peroxide onto the cut.  After the wound is done healing, use sunscreen over the area whenever exposed for the next 6 minths to avoid a darker scar.     FOLLOW UP: Most skin wounds heal within ten days. Mouth and facial wounds heal within five days. However, even with proper treatment, a wound infection may sometimes occur. Therefore, you should check the wound daily for signs of infection listed below.  Stitches should be removed from the face within five days; stitches and staples should be removed from other parts of the body within 7-10 days. Unless you are told to come back to the emergency room, you may have your doctor or urgent care remove the stitches. If dissolving stitches were used in the mouth, these will fall out or dissolve without the need for removal. If tape closures ( Steri-Strips ) were used, remove them yourself if they have not fallen off after 7 days. If Dermabond skin glue was used, the film will fall off by itself in 5-10 days.      GET PROMPT MEDICAL ATTENTION  if any of the following occur:    Increasing pain in the wound    Redness, swelling or pus coming from the wound    Fever over 101 F (38.3 C) oral    If  "stitches or staples come apart or fall out or if Steri-Strips fall off before seven days    If the wound edges re-open    Bleeding not controlled by direct pressure    0804-9261 [employer]. [address] [place] [address], [place] [address]. All rights reserved. This information is not intended as a substitute for professional medical care. Always follow your healthcare professional's instructions.  This information has been modified by your health care provider with permission from the publisher.      Your next 10 appointments already scheduled     Jul 19, 2018  1:00 PM CDT   Treatment with Dequan Mascorro PT   [facility] ([facility])    [address] [address]   [phone]            Jul 26, 2018 11:30 AM CDT   Treatment with Brenda Ac PTA   [facility]    [address] [address]   [phone]            Jul 30, 2018  2:30 PM CDT   Treatment with Dequan Mascorro PT   [facility] ([facility])    [address] [address]   [phone]            Aug 01, 2018  2:15 PM CDT   Treatment with Brenda Ac PTA   [facility] ([facility])    [address] [address]   [phone]                                                  INTERAGENCY TRANSFER FORM - NURSING   7/18/2018                       [facility] AND [facility]: [phone]            Attending Provider: Joe Colin MD     Allergies:  Seasonal Allergies    Infection:  None   Service:  EMERGENCY ME    Ht:  1.651 m (5' 5\")   Wt:  103 kg (227 lb)   Admission Wt:  103 kg (227 lb)    BMI:  37.77 kg/m 2   BSA:  2.17 m 2            Advance Directives        Scanned docmt in ACP Activity?           Yes, scanned ACP docmt        Immunizations     Name Date      " "TDAP Vaccine (Boostrix) 07/18/18       ASSESSMENT     Discharge Profile Flowsheet     COMMUNICATION ASSESSMENT     SKIN      Patient's communication style  spoken language (English or Bilingual) 07/18/18 1414   Skin WDL  ex 07/18/18 1903                 Assessment WDL (Within Defined Limits) Definitions           Skin WDL     Effective: 09/28/15    Row Information: <b>WDL Definition:</b> Warm; dry; intact; elastic; without discoloration; pressure points without redness<br> <font color=\"gray\"><i>Item=AS skin wdl>>List=AS skin wdl>>Version=F14</i></font>      Vitals     Vital Signs Flowsheet     VITAL SIGNS     HEIGHT AND WEIGHT      Temp  98.5  F (36.9  C) 07/18/18 1416   Height  1.651 m (5' 5\") 07/18/18 1416    Temp src  Tympanic 07/18/18 1416   Height Method  Stated 07/18/18 1416    Resp  16 07/18/18 1904   Weight  103 kg (227 lb) 07/18/18 1416    Pulse  78 07/18/18 1904   BSA (Calculated - sq m)  2.17 07/18/18 1416    Pulse/Heart Rate Source  Monitor 07/18/18 1904   BMI (Calculated)  37.85 07/18/18 1416    BP  (!)  165/92 07/18/18 2036   IZZY COMA SCALE      OXYGEN THERAPY     Best Eye Response  4-->(E4) spontaneous 07/18/18 1416    SpO2  94 % 07/18/18 1904   Best Motor Response  6-->(M6) obeys commands 07/18/18 1416    O2 Device  None (Room air) 07/18/18 1904   Best Verbal Response  5-->(V5) oriented 07/18/18 1416    PAIN/COMFORT     Izzy Coma Scale Score  15 07/18/18 1416    0-10 Pain Scale  3 07/18/18 1416                 Patient Lines/Drains/Airways Status    Active LINES/DRAINS/AIRWAYS     None            Patient Lines/Drains/Airways Status    Active PICC/CVC     None            Intake/Output Detail Report     None      Case Management/Discharge Planning     Case Management/Discharge Planning Flowsheet     ABUSE RISK SCREEN     OBSERVATION: Is there reason to believe there has been maltreatment of a vulnerable adult (ie. Physical/Sexual/Emotional abuse, self neglect, lack of adequate food, shelter, " medical care, or financial exploitation)?  no 18 1415    QUESTION TO PATIENT:  Has a member of your family or a partner(now or in the past) intimidated, hurt, manipulated, or controlled you in any way?  no 18 1415   HOMICIDE RISK      QUESTION TO PATIENT: Do you feel safe going back to the place where you are living?  yes 18 1415   Feels Like Hurting Others  no 18 1415                  [facility]: [phone]            Medication Administration Report for Delvin Gray as of 18   Legend:    Given Hold Not Given Due Canceled Entry Other Actions    Time Time (Time) Time  Time-Action       Inactive    Active    Linked        Medications 07/12/18 07/13/18 07/14/18 07/15/18 07/16/18 07/17/18 07/18/18      Dose: 0.1 mg  Freq: ONCE Route: PO  Start: 18   End: 18   Admin. Amount: 1 tablet (1 × 0.1 mg tablet)  Administrations Remainin                  -Med Discontinued      Completed Medications  Medications 07/12/18 07/13/18 07/14/18 07/15/18 07/16/18 07/17/18 07/18/18         Dose: 0.1 mg  Freq: ONCE Route: PO  Start: 18   End: 18   Admin. Amount: 1 tablet (1 × 0.1 mg tablet)  Last Admin: 18  Dispense Loc: EMERGENCY DEPARTMENT  Administrations Remainin            (0.1 mg)-Given             Dose: 40 mg  Freq: ONCE Route: ID  Start: 18   End: 18   Admin. Amount: 40 mg = 2 mL Conc: 20 mg/mL  Last Admin: 18  Dispense Loc:  MAIN PHARMACY  Administrations Remainin  Volume: 2 mL            (40 mg)-Given             Dose: 0.5 mL  Freq: ONCE Route: IM  Start: 18   End: 18   Admin. Amount: 0.5 mL  Last Admin: 18  Dispense Loc: EMERGENCY DEPARTMENT  Administrations Remainin  Volume: 0.5 mL            (0.5 mL)-Given                    INTERAGENCY TRANSFER FORM - NOTES (H&P, Discharge Summary, Consults, Procedures,  Therapies)   7/18/2018                       [facility]: [phone]            History & Physicals     No notes of this type exist for this encounter.      Discharge Summaries     No notes of this type exist for this encounter.      Consult Notes     No notes of this type exist for this encounter.         Progress Notes - Physician (Notes for yesterday and today)      ED Provider Notes by Joe Colin MD at 7/18/2018  1:59 PM     Author:  Joe Colin MD Service:  Emergency Medicine Author Type:  Physician    Filed:  7/18/2018  8:49 PM Date of Service:  7/18/2018  1:59 PM Creation Time:  7/18/2018  8:43 PM    Status:  Signed :  Joe Colin MD (Physician)           History   No chief complaint on file.    HPI  Delvin Gray is a 62 year old male who sustained a crush injury to his 2nd finger, left.  He got it trapped between two heavy objects.  He does not think there is anything stuck inside.  Minimal pain with moving the finger.    He has a very large laceration , roughly 5-6 cm, to the entire 2nd finger near the PIP, all along the dorsum of the finger.      Not UTD on tetanus.      He also states he has issues with HTN.    Problem List:    Patient Active Problem List    Diagnosis Date Noted     Environmental allergies 08/30/2012     Priority: Medium     Obesity 08/30/2012     Priority: Medium     Chronic hepatitis B virus infection (H) 08/30/2012     Priority: Medium        Past Medical History:    No past medical history on file.    Past Surgical History:    No past surgical history on file.    Family History:    No family history on file.    Social History:  Marital Status:  Single [1]  Social History   Substance Use Topics     Smoking status: Former Smoker     Smokeless tobacco: Not on file     Alcohol use 0.6 oz/week     1 Cans of beer per week      Comment: opccasional        Medications:      carboxymethylcellulose (CELLUVISC/REFRESH LIQUIGEL) 1 %  "ophthalmic solution   fluticasone (FLONASE) 50 MCG/ACT spray   fluticasone (FLONASE) 50 MCG/ACT spray   gabapentin (NEURONTIN) 300 MG capsule   GABAPENTIN PO   hypromellose (GENTEAL) 0.3 % SOLN ophthalmic solution   loratadine (CLARITIN REDITABS) 10 MG ODT tab   loratadine (CLARITIN) 10 MG tablet   naproxen (NAPROSYN) 500 MG tablet   NAPROXEN PO   omeprazole (PRILOSEC) 20 MG CR capsule   OMEPRAZOLE PO   rOPINIRole (REQUIP) 1 MG tablet   ROPINIROLE HCL PO   artificial tears OINT ophthalmic ointment   lidocaine (LMX4) 4 % CREA cream   oxyCODONE-acetaminophen (PERCOCET) 5-325 MG per tablet   polyethylene glycol (MIRALAX/GLYCOLAX) powder   TRAMADOL HCL PO         Review of Systems  Has not been otherwise unwell  Physical Exam   BP: (!) 171/91  Pulse: 96  Temp: 98.5  F (36.9  C)  Resp: 20  Height: 165.1 cm (5' 5\")  Weight: 103 kg (227 lb)  SpO2: 95 %      Physical Examwell man.  Heart reg.  Lungs clear.  His BP improves with clonidine.  He has no signs of end-organ damage as evidenced by creatinine, no neurologic signs and no chest pain.  He has a 5cm laceration to the 2nd left hand.  It does not appear to have anything inside, no particulate matter, no metallic objects.  He can bend at that joint easily and without pain.  xrays look negative on my read.  He has normal cap refill.      ED Course     ED Course     Procedures        universal precautions utilized.  Risks and benefits discussed.  Lidocaine infused into the base volarly about the MCP joint with very good results.  I then inspected the wound again, looks very clean, and placed twelve 4.0 ethilon sutures.  The process took about an hour.  On one side, the wound did not come together well and there is a raised area of skin.         Critical Care time:  none               Results for orders placed or performed during the hospital encounter of 07/18/18 (from the past 24 hour(s))   XR Finger Left G/E 2 Views    Narrative    PROCEDURE:  XR FINGER LT G/E 2 " VW    HISTORY: laceration of 2nd finger of left hand; .    COMPARISON:  None.    TECHNIQUE:  3 views left second digit.    FINDINGS:  Focal soft tissue deformity of the proximal aspect of the  left second digit is seen. No retained dense radiopaque foreign body  is seen. No underlying fracture is identified. Mild DIP and first CMC  joint degeneration is seen.       Impression    IMPRESSION: No retained foreign body or acute fracture.      VINCE THOMPSON MD   Basic metabolic panel   Result Value Ref Range    Sodium 140 134 - 144 mmol/L    Potassium 4.3 3.5 - 5.1 mmol/L    Chloride 106 98 - 107 mmol/L    Carbon Dioxide 26 21 - 31 mmol/L    Anion Gap 8 3 - 14 mmol/L    Glucose 94 70 - 105 mg/dL    Urea Nitrogen 22 7 - 25 mg/dL    Creatinine 1.11 0.70 - 1.30 mg/dL    GFR Estimate 68 >60 mL/min/1.7m2    GFR Estimate If Black 82 >60 mL/min/1.7m2    Calcium 9.4 8.6 - 10.3 mg/dL       Medications   lidocaine injection 2% (40 mg Intradermal Given 7/18/18 2013)   Tdap (tetanus-diptheria-acell pertussis) (BOOSTRIX) injection 0.5 mL (0.5 mLs Intramuscular Given 7/18/18 2012)   cloNIDine (CATAPRES) tablet 0.1 mg (0.1 mg Oral Given 7/18/18 2012)       Assessments & Plan (with Medical Decision Making)     I have reviewed the nursing notes.    I have reviewed the findings, diagnosis, plan and need for follow up with the patient.   the patient is given a volar splint considering this is over a joint, and stitches should come out in 12- 14 days considering it is very close to a joint.  TDap given.  Watch for symptoms and signs of infection.    He has HTN, that improved with clonidine.  He should follow up with his regular doctor tomorrow for re-assessment.          New Prescriptions    No medications on file       Final diagnoses:   Laceration of index finger without foreign body without damage to nail, unspecified laterality, initial encounter     , 5cm  7/18/2018   [facility] AND [facility][CP1.1]     Vince Colin  MD ANNABEL  07/18/18 2049  [CP1.2]     Revision History        User Key Date/Time User Provider Type Action    > CP1.2 7/18/2018  8:49 PM Joe Colin MD Physician Sign     CP1.1 7/18/2018  8:43 PM Joe Colin MD Physician                   Procedure Notes     No notes of this type exist for this encounter.      Progress Notes - Therapies (Notes from 07/15/18 through 07/18/18)     No notes of this type exist for this encounter.                                          INTERAGENCY TRANSFER FORM - LAB / IMAGING / EKG / EMG RESULTS   7/18/2018                       [facility]: [phone]            Unresulted Labs     None         Lab Results - 3 Days      Basic metabolic panel [[id number]]  Resulted: 07/18/18 2039, Result status: Final result    Ordering provider: Joe Colin MD  07/18/18 2002 Resulting lab: [facility]    Specimen Information    Type Source Collected On   Blood  07/18/18 2008          Components       Value Reference Range Flag Lab   Sodium 140 134 - 144 mmol/L  [facility]   Potassium 4.3 3.5 - 5.1 mmol/L  [facility]   Chloride 106 98 - 107 mmol/L  [facility]   Carbon Dioxide 26 21 - 31 mmol/L  [facility]   Anion Gap 8 3 - 14 mmol/L  [facility]   Glucose 94 70 - 105 mg/dL  [facility]   Urea Nitrogen 22 7 - 25 mg/dL  [facility]   Creatinine 1.11 0.70 - 1.30 mg/dL  [facility]   GFR Estimate 68 >60 mL/min/1.7m2  [facility]   GFR Estimate If Black 82 >60 mL/min/1.7m2  [facility]   Calcium 9.4 8.6 - 10.3 mg/dL  [facility]            Testing Performed By     Lab - Abbreviation Name Director Address Valid Date Range    1743 - [facility] Unknown [address]  [facility] [place] [address] 08/07/15 0948 - Present               Imaging Results - 3 Days      XR Finger Left G/E 2 Views [[id number]]  Resulted: 07/18/18 1445, Result status: Final result    Ordering provider: Joe Colin MD  07/18/18 2656  Resulted by: Vince Thompson MD    Performed: 07/18/18 1426 - 07/18/18 1437 Resulting lab: RADIOLOGY RESULTS    Narrative:       PROCEDURE:  XR FINGER LT G/E 2 VW    HISTORY: laceration of 2nd finger of left hand; .    COMPARISON:  None.    TECHNIQUE:  3 views left second digit.    FINDINGS:  Focal soft tissue deformity of the proximal aspect of the  left second digit is seen. No retained dense radiopaque foreign body  is seen. No underlying fracture is identified. Mild DIP and first CMC  joint degeneration is seen.       Impression:       IMPRESSION: No retained foreign body or acute fracture.      VINCE THOMPSON MD      Testing Performed By     Lab - Abbreviation Name Director Address Valid Date Range    104 - [facility] RADIOLOGY RESULTS Unknown Unknown 02/16/05 1553 - Present            Encounter-Level Documents:     There are no encounter-level documents.      Order-Level Documents:     There are no order-level documents.

## 2018-07-18 NOTE — ED AVS SNAPSHOT
[facility]    [address]    [place] [address]    Phone:  [phone]    Fax:  [phone]                                       Delvin Gray   MRN: [id number]    Department:  [facility]   Date of Visit:  7/18/2018           Patient Information     Date Of Birth          1955        Your diagnoses for this visit were:     Laceration of index finger without foreign body without damage to nail, unspecified laterality, initial encounter        You were seen by Joe Colin MD.      Follow-up Information     Follow up with [facility].    Specialty:  EMERGENCY MEDICINE    Why:  As needed    Contact information:    [address]  [place] [address] [phone]        Discharge Instructions          * LACERATION (All Closures)  A laceration is a cut through the skin. This will usually require stitches (sutures) or staples if it is deep. Minor cuts may be treated with a tape closure ( Steri-Strips ) or Dermabond skin glue.       HOME CARE:  PAIN MEDICINE: You may use acetaminophen (Tylenol) 650-1000 mg every 6 hours or ibuprofen (Motrin, Advil) 600 mg every 6-8 hours with food to control pain, if you are able to take these medicines. [NOTE: If you have chronic liver or kidney disease or ever had a stomach ulcer or GI bleeding, talk with your doctor before using these medicines.]  EXTREMITY, FACE or TRUNK WOUNDS:    Keep the wound clean and dry. If a bandage was applied and it becomes wet or dirty, replace it. Otherwise, leave it in place for the first 24 hours.    If stitches or staples were used, clean the wound daily. Protect the wound from sunlight and tanning lamps.    After removing the bandage, wash the area with soap and water. Use a wet cotton swab (Q tip) to loosen and remove any blood or crust that forms.    After cleaning, apply a thin layer of Polysporin or Bacitracin ointment. This will  keep the wound clean and make it easier to remove the stitches or staples. Reapply a fresh bandage.    You may remove the bandage to shower as usual after the first 24 hours, but do not soak the area in water (no swimming) until the stitches or staples are removed.    If Steri-Strips were used, keep the area clean and dry. If it becomes wet, blot it dry with a towel. It is okay to take a brief shower, but avoid scrubbing the area.    If Dermabond skin adhesive was used, do not scratch, rub or pick at the adhesive film. Do not place tape directly over the film. Do not apply liquid, ointment or creams to the wound while the film is in place. Do not clean the wound with peroxide and do not apply ointments. Avoid activities that cause heavy sweating until the film has fallen off. Protect the wound from prolonged exposure to sunlight or tanning lamps. You may shower as usual but do not soak the wound in water (no baths or swimming). The film will fall off by itself in 5-10 days.  SCALP WOUNDS: During the first two days, you may carefully rinse your hair in the shower to remove blood, glass or dirt particles. After two days, you may shower and shampoo your hair normally. Do not soak your scalp in the tub or go swimming until the stitches or staples have been removed.  MOUTH WOUNDS: Eat soft foods to reduce pain. If the cut is inside of your mouth, clean by rinsing after each meal and at bedtime with a mixture of equal parts water and Hydrogen Peroxide (do not swallow!). Or, you can use a cotton swab to directly apply Hydrogen Peroxide onto the cut.  After the wound is done healing, use sunscreen over the area whenever exposed for the next 6 minths to avoid a darker scar.     FOLLOW UP: Most skin wounds heal within ten days. Mouth and facial wounds heal within five days. However, even with proper treatment, a wound infection may sometimes occur. Therefore, you should check the wound daily for signs of infection listed  below.  Stitches should be removed from the face within five days; stitches and staples should be removed from other parts of the body within 7-10 days. Unless you are told to come back to the emergency room, you may have your doctor or urgent care remove the stitches. If dissolving stitches were used in the mouth, these will fall out or dissolve without the need for removal. If tape closures ( Steri-Strips ) were used, remove them yourself if they have not fallen off after 7 days. If Dermabond skin glue was used, the film will fall off by itself in 5-10 days.      GET PROMPT MEDICAL ATTENTION  if any of the following occur:    Increasing pain in the wound    Redness, swelling or pus coming from the wound    Fever over 101 F (38.3 C) oral    If stitches or staples come apart or fall out or if Steri-Strips fall off before seven days    If the wound edges re-open    Bleeding not controlled by direct pressure    7723-7223 [employer]. [address] [place] [address]. All rights reserved. This information is not intended as a substitute for professional medical care. Always follow your healthcare professional's instructions.  This information has been modified by your health care provider with permission from the publisher.      Your next 10 appointments already scheduled     Jul 19, 2018  1:00 PM CDT   Treatment with Dequan Mascorro PT   [facility] ([facility])    [address] [address]   [phone]            Jul 26, 2018 11:30 AM CDT   Treatment with Brenda Ac PTA   [facility] ([facility])    [address] [address]   [phone]            Jul 30, 2018  2:30 PM CDT   Treatment with Dequan Mascorro PT   [facility] ([facility])    [address] [address]   [phone]            Aug  01, 2018  2:15 PM CDT   Treatment with Brenda MONTOYA ISMA Ac   [facility] ([place] [facility])    [address] [facility] [id number] [phone]              24 Hour Appointment Hotline     To schedule an appointment at [place], please call [phone]. If you don't have a family doctor or clinic, we will help you find one. [facility] are conveniently located to serve the needs of you and your family.           Review of your medicines      Our records show that you are taking the medicines listed below. If these are incorrect, please call your family doctor or clinic.        Dose / Directions Last dose taken    artificial tears Oint ophthalmic ointment        At Bedtime   Refills:  0        carboxymethylcellulose 1 % ophthalmic solution   Commonly known as:  CELLUVISC/REFRESH LIQUIGEL   Dose:  1 drop        1 drop 3 times daily   Refills:  0        * fluticasone 50 MCG/ACT spray   Commonly known as:  FLONASE   Dose:  1 spray        Spray 1 spray into both nostrils daily   Refills:  0        * fluticasone 50 MCG/ACT spray   Commonly known as:  FLONASE   Dose:  1 spray        Spray 1 spray into both nostrils daily   Refills:  0        * GABAPENTIN PO        Take by mouth 3 times daily Pt takes 400 mg in am, 400 mg in elin, and 1200 mg at hs   Refills:  0        * gabapentin 300 MG capsule   Commonly known as:  NEURONTIN   Dose:  300 mg        Take 300 mg by mouth 3 times daily   Refills:  0        hypromellose 0.3 % Soln ophthalmic solution   Commonly known as:  GENTEAL   Dose:  1 drop        1 drop every hour as needed for dry eyes   Refills:  0        lidocaine 4 % Crea cream   Commonly known as:  LMX4        Apply topically once as needed for mild pain   Refills:  0        * loratadine 10 MG ODT tab   Commonly known as:  CLARITIN REDITABS   Dose:  10 mg        Take 10 mg by mouth daily   Refills:  0        * loratadine 10 MG tablet   Commonly known as:   CLARITIN   Dose:  10 mg        Take 10 mg by mouth daily   Refills:  0        * NAPROXEN PO   Dose:  500 mg        Take 500 mg by mouth 2 times daily (with meals)   Refills:  0        * naproxen 500 MG tablet   Commonly known as:  NAPROSYN   Dose:  500 mg        Take 500 mg by mouth 2 times daily (with meals)   Refills:  0        * OMEPRAZOLE PO   Dose:  20 mg        Take 20 mg by mouth 2 times daily (before meals)   Refills:  0        * omeprazole 20 MG CR capsule   Commonly known as:  priLOSEC   Dose:  20 mg        Take 20 mg by mouth daily Before a meal   Refills:  0        oxyCODONE-acetaminophen 5-325 MG per tablet   Commonly known as:  PERCOCET   Dose:  1-2 tablet        Take 1-2 tablets by mouth every 6 hours as needed for pain Max acetaminophen dose: 4000mg in 24 hrs.   Refills:  0        polyethylene glycol powder   Commonly known as:  MIRALAX/GLYCOLAX   Dose:  17 g        Take 17 g by mouth daily as needed for constipation   Refills:  0        * ROPINIROLE HCL PO   Dose:  1 mg        Take 1 mg by mouth At Bedtime   Refills:  0        * rOPINIRole 1 MG tablet   Commonly known as:  REQUIP   Dose:  1 mg        Take 1 mg by mouth 2 times daily   Refills:  0        TRAMADOL HCL PO   Dose:  50 mg        Take 50 mg by mouth every 6 hours as needed for moderate to severe pain   Refills:  0        * Notice:  This list has 12 medication(s) that are the same as other medications prescribed for you. Read the directions carefully, and ask your doctor or other care provider to review them with you.            Procedures and tests performed during your visit     Basic metabolic panel    XR Finger Left G/E 2 Views      Orders Needing Specimen Collection     None      Pending Results     No orders found from 7/16/2018 to 7/19/2018.            Pending Culture Results     No orders found from 7/16/2018 to 7/19/2018.            Pending Results Instructions     If you had any lab results that were not finalized at the time of  your Discharge, you can call the ED Lab Result RN at [phone]. You will be contacted by this team for any positive Lab results or changes in treatment. The nurses are available 7 days a week from 10A to 6:30P.  You can leave a message 24 hours per day and they will return your call.        Thank you for choosing [facility]       Thank you for choosing [facility] for your care. Our goal is always to provide you with excellent care. Hearing back from our patients is one way we can continue to improve our services. Please take a few minutes to complete the written survey that you may receive in the mail after you visit with us. Thank you!        [employer] Information     [facility] gives you secure access to your electronic health record. If you see a primary care provider, you can also send messages to your care team and make appointments. If you have questions, please call your primary care clinic.  If you do not have a primary care provider, please call [phone] and they will assist you.        Care EveryWhere ID     This is your Care EveryWhere ID. This could be used by other organizations to access your [facility] medical records  [id number]        Equal Access to Services     MAGDALENA NOLEN : Hadrichard Herrera, elly mello, falguni agustin. So [facility] [phone].    ATENCIÓN: Si habla español, tiene a enciso disposición servicios gratuitos de asistencia lingüística. Dao al [phone].    We comply with applicable federal civil rights laws and [place] laws. We do not discriminate on the basis of race, color, national origin, age, disability, sex, sexual orientation, or gender identity.            After Visit Summary       This is your record. Keep this with you and show to your community pharmacist(s) and doctor(s) at your next visit.

## 2018-07-19 ENCOUNTER — HOSPITAL ENCOUNTER (OUTPATIENT)
Dept: PHYSICAL THERAPY | Facility: OTHER | Age: 63
Setting detail: THERAPIES SERIES
End: 2018-07-19
Attending: NURSE PRACTITIONER
Payer: COMMERCIAL

## 2018-07-19 PROCEDURE — 97110 THERAPEUTIC EXERCISES: CPT | Mod: GP | Performed by: PHYSICAL THERAPIST

## 2018-07-19 PROCEDURE — 97012 MECHANICAL TRACTION THERAPY: CPT | Mod: GP | Performed by: PHYSICAL THERAPIST

## 2018-07-19 PROCEDURE — 40000185 ZZHC STATISTIC PT OUTPT VISIT: Performed by: PHYSICAL THERAPIST

## 2018-07-19 NOTE — DISCHARGE INSTRUCTIONS
* LACERATION (All Closures)  A laceration is a cut through the skin. This will usually require stitches (sutures) or staples if it is deep. Minor cuts may be treated with a tape closure ( Steri-Strips ) or Dermabond skin glue.       HOME CARE:  PAIN MEDICINE: You may use acetaminophen (Tylenol) 650-1000 mg every 6 hours or ibuprofen (Motrin, Advil) 600 mg every 6-8 hours with food to control pain, if you are able to take these medicines. [NOTE: If you have chronic liver or kidney disease or ever had a stomach ulcer or GI bleeding, talk with your doctor before using these medicines.]  EXTREMITY, FACE or TRUNK WOUNDS:    Keep the wound clean and dry. If a bandage was applied and it becomes wet or dirty, replace it. Otherwise, leave it in place for the first 24 hours.    If stitches or staples were used, clean the wound daily. Protect the wound from sunlight and tanning lamps.    After removing the bandage, wash the area with soap and water. Use a wet cotton swab (Q tip) to loosen and remove any blood or crust that forms.    After cleaning, apply a thin layer of Polysporin or Bacitracin ointment. This will keep the wound clean and make it easier to remove the stitches or staples. Reapply a fresh bandage.    You may remove the bandage to shower as usual after the first 24 hours, but do not soak the area in water (no swimming) until the stitches or staples are removed.    If Steri-Strips were used, keep the area clean and dry. If it becomes wet, blot it dry with a towel. It is okay to take a brief shower, but avoid scrubbing the area.    If Dermabond skin adhesive was used, do not scratch, rub or pick at the adhesive film. Do not place tape directly over the film. Do not apply liquid, ointment or creams to the wound while the film is in place. Do not clean the wound with peroxide and do not apply ointments. Avoid activities that cause heavy sweating until the film has fallen off. Protect the wound from prolonged  exposure to sunlight or tanning lamps. You may shower as usual but do not soak the wound in water (no baths or swimming). The film will fall off by itself in 5-10 days.  SCALP WOUNDS: During the first two days, you may carefully rinse your hair in the shower to remove blood, glass or dirt particles. After two days, you may shower and shampoo your hair normally. Do not soak your scalp in the tub or go swimming until the stitches or staples have been removed.  MOUTH WOUNDS: Eat soft foods to reduce pain. If the cut is inside of your mouth, clean by rinsing after each meal and at bedtime with a mixture of equal parts water and Hydrogen Peroxide (do not swallow!). Or, you can use a cotton swab to directly apply Hydrogen Peroxide onto the cut.  After the wound is done healing, use sunscreen over the area whenever exposed for the next 6 minths to avoid a darker scar.     FOLLOW UP: Most skin wounds heal within ten days. Mouth and facial wounds heal within five days. However, even with proper treatment, a wound infection may sometimes occur. Therefore, you should check the wound daily for signs of infection listed below.  Stitches should be removed from the face within five days; stitches and staples should be removed from other parts of the body within 7-10 days. Unless you are told to come back to the emergency room, you may have your doctor or urgent care remove the stitches. If dissolving stitches were used in the mouth, these will fall out or dissolve without the need for removal. If tape closures ( Steri-Strips ) were used, remove them yourself if they have not fallen off after 7 days. If Dermabond skin glue was used, the film will fall off by itself in 5-10 days.      GET PROMPT MEDICAL ATTENTION  if any of the following occur:    Increasing pain in the wound    Redness, swelling or pus coming from the wound    Fever over 101 F (38.3 C) oral    If stitches or staples come apart or fall out or if Steri-Strips fall  off before seven days    If the wound edges re-open    Bleeding not controlled by direct pressure    2505-1620 [employer]. [address] [place] [address], [place] [address]. All rights reserved. This information is not intended as a substitute for professional medical care. Always follow your healthcare professional's instructions.  This information has been modified by your health care provider with permission from the publisher.

## 2018-07-19 NOTE — ED NOTES
Finger wrapped with adaptic and gauze, finger splint placed per Provider. Patient educated on stitch and wound care.

## 2018-07-19 NOTE — ED PROVIDER NOTES
History   No chief complaint on file.    HPI  Delvin Gray is a 62 year old male who sustained a crush injury to his 2nd finger, left.  He got it trapped between two heavy objects.  He does not think there is anything stuck inside.  Minimal pain with moving the finger.    He has a very large laceration , roughly 5-6 cm, to the entire 2nd finger near the PIP, all along the dorsum of the finger.      Not UTD on tetanus.      He also states he has issues with HTN.    Problem List:    Patient Active Problem List    Diagnosis Date Noted     Environmental allergies 08/30/2012     Priority: Medium     Obesity 08/30/2012     Priority: Medium     Chronic hepatitis B virus infection (H) 08/30/2012     Priority: Medium        Past Medical History:    No past medical history on file.    Past Surgical History:    No past surgical history on file.    Family History:    No family history on file.    Social History:  Marital Status:  Single [1]  Social History   Substance Use Topics     Smoking status: Former Smoker     Smokeless tobacco: Not on file     Alcohol use 0.6 oz/week     1 Cans of beer per week      Comment: opccasional        Medications:      carboxymethylcellulose (CELLUVISC/REFRESH LIQUIGEL) 1 % ophthalmic solution   fluticasone (FLONASE) 50 MCG/ACT spray   fluticasone (FLONASE) 50 MCG/ACT spray   gabapentin (NEURONTIN) 300 MG capsule   GABAPENTIN PO   hypromellose (GENTEAL) 0.3 % SOLN ophthalmic solution   loratadine (CLARITIN REDITABS) 10 MG ODT tab   loratadine (CLARITIN) 10 MG tablet   naproxen (NAPROSYN) 500 MG tablet   NAPROXEN PO   omeprazole (PRILOSEC) 20 MG CR capsule   OMEPRAZOLE PO   rOPINIRole (REQUIP) 1 MG tablet   ROPINIROLE HCL PO   artificial tears OINT ophthalmic ointment   lidocaine (LMX4) 4 % CREA cream   oxyCODONE-acetaminophen (PERCOCET) 5-325 MG per tablet   polyethylene glycol (MIRALAX/GLYCOLAX) powder   TRAMADOL HCL PO         Review of Systems  Has not been otherwise unwell  Physical  "Exam   BP: (!) 171/91  Pulse: 96  Temp: 98.5  F (36.9  C)  Resp: 20  Height: 165.1 cm (5' 5\")  Weight: 103 kg (227 lb)  SpO2: 95 %      Physical Examwell man.  Heart reg.  Lungs clear.  His BP improves with clonidine.  He has no signs of end-organ damage as evidenced by creatinine, no neurologic signs and no chest pain.  He has a 5cm laceration to the 2nd left hand.  It does not appear to have anything inside, no particulate matter, no metallic objects.  He can bend at that joint easily and without pain.  xrays look negative on my read.  He has normal cap refill.      ED Course     ED Course     Procedures        universal precautions utilized.  Risks and benefits discussed.  Lidocaine infused into the base volarly about the MCP joint with very good results.  I then inspected the wound again, looks very clean, and placed twelve 4.0 ethilon sutures.  The process took about an hour.  On one side, the wound did not come together well and there is a raised area of skin.         Critical Care time:  none               Results for orders placed or performed during the hospital encounter of 07/18/18 (from the past 24 hour(s))   XR Finger Left G/E 2 Views    Narrative    PROCEDURE:  XR FINGER LT G/E 2 VW    HISTORY: laceration of 2nd finger of left hand; .    COMPARISON:  None.    TECHNIQUE:  3 views left second digit.    FINDINGS:  Focal soft tissue deformity of the proximal aspect of the  left second digit is seen. No retained dense radiopaque foreign body  is seen. No underlying fracture is identified. Mild DIP and first CMC  joint degeneration is seen.       Impression    IMPRESSION: No retained foreign body or acute fracture.      VINCE THOMPSON MD   Basic metabolic panel   Result Value Ref Range    Sodium 140 134 - 144 mmol/L    Potassium 4.3 3.5 - 5.1 mmol/L    Chloride 106 98 - 107 mmol/L    Carbon Dioxide 26 21 - 31 mmol/L    Anion Gap 8 3 - 14 mmol/L    Glucose 94 70 - 105 mg/dL    Urea Nitrogen 22 7 - 25 " mg/dL    Creatinine 1.11 0.70 - 1.30 mg/dL    GFR Estimate 68 >60 mL/min/1.7m2    GFR Estimate If Black 82 >60 mL/min/1.7m2    Calcium 9.4 8.6 - 10.3 mg/dL       Medications   lidocaine injection 2% (40 mg Intradermal Given 7/18/18 2013)   Tdap (tetanus-diptheria-acell pertussis) (BOOSTRIX) injection 0.5 mL (0.5 mLs Intramuscular Given 7/18/18 2012)   cloNIDine (CATAPRES) tablet 0.1 mg (0.1 mg Oral Given 7/18/18 2012)       Assessments & Plan (with Medical Decision Making)     I have reviewed the nursing notes.    I have reviewed the findings, diagnosis, plan and need for follow up with the patient.   the patient is given a volar splint considering this is over a joint, and stitches should come out in 12- 14 days considering it is very close to a joint.  TDap given.  Watch for symptoms and signs of infection.    He has HTN, that improved with clonidine.  He should follow up with his regular doctor tomorrow for re-assessment.          New Prescriptions    No medications on file       Final diagnoses:   Laceration of index finger without foreign body without damage to nail, unspecified laterality, initial encounter     , 5cm  7/18/2018   [facility]     Joe Colin MD  07/18/18 2042

## 2018-07-20 NOTE — PROGRESS NOTES
Outpatient Physical Therapy Progress Note     Patient: Delvin Gray  : 1955    Beginning/End Dates of Reporting Period:  18 to 2018    Referring Provider: Dr. Jen Galvan MD    Therapy Diagnosis: Neck and shoulder pain, decreased ROM, Shoulder and hand weakness.  (Signs and symptoms consistent with carpal tunnel syndrome bilaterally).       Client Self Report: Patient reports having a finger injury and had to be treated in the ER .  Unable to perform  testing today.  He reports his neck pain/cramping along with radiating symptoms are improving.  Minimal shoulder blade pain reported.  This has also improved.  He reports compliance with HEP.  He states the neck traction gives him multiple days of relief.  We discussed the option of having a cervical home traction unit to use to manage his pain and radicular symptoms.  He is interested in this option.  He will discuss this with his primary MD at the next visit.      Objective Measurements:  Hand strength testing with dynamometer deferred due to hand injury.    Goals:  Goal Identifier Sleep   Goal Description Sleep through the night with no sleep disruption due to pain.   Target Date 18   Date Met      Progress: Decrease in pain intensity and frequency reported.       Goal Identifier Packing boxes at work   Goal Description Patient will tolerate 6-8 hours of work rating pain less then 3/10 consistently.   Target Date 18   Date Met      Progress:  Decrease in neck pain and radiating arm/shoulder pain.     Goal Identifier HEP   Goal Description Independent and compliant with advanced HEP   Target Date 18   Date Met      Progress:  Progressing to more advanced exercises       Progress Toward Goals:   Progress this reporting period: Decrease in neck and scapular/shoulder blade pain.  Decrease in radicular arm pain.   strength has not changed up to injury on 18.        Assessment:    Neck pain with  radiculopathy  Possible carpal tunnel symptoms in hand - pain, weakness of hand  Decreased cervical ROM  Shoulder weakness/scapular weakness    Plan:  Continue therapy per current plan of care.  Recommend a [facility] cervical traction unit to manage neck pain and radicular pain.    Discharge:  No

## 2018-07-26 ENCOUNTER — HOSPITAL ENCOUNTER (OUTPATIENT)
Dept: PHYSICAL THERAPY | Facility: OTHER | Age: 63
Setting detail: THERAPIES SERIES
End: 2018-07-26
Attending: NURSE PRACTITIONER
Payer: COMMERCIAL

## 2018-07-26 PROCEDURE — 40000185 ZZHC STATISTIC PT OUTPT VISIT: Performed by: PHYSICAL THERAPIST

## 2018-07-26 PROCEDURE — 97012 MECHANICAL TRACTION THERAPY: CPT | Mod: GP | Performed by: PHYSICAL THERAPIST

## 2018-07-26 NOTE — PROGRESS NOTES
Outpatient Physical Therapy Progress Note     Patient: Delvin Gray  : 1955    Referring Provider: Jen Galvan MD    Therapy Diagnosis: Neck Pain, OA cervical spine     Client Self Report: Patient reports having a finger injury and had to be treated in the ER .  Unable to perform  testing today.  He reports his neck pain/cramping along with radiating symptoms are improving.  Minimal shoulder blade pain reported.  This has also improved.  He reports compliance with HEP.  He states the neck traction gives him multiple days of relief.  We discussed the option of having a Gutierrez cervical home traction unit to use to manage his pain and radicular symptoms.  He is interested in this option.  He will discuss this with his primary MD at the next visit.  Overall he reports he has had an 80% reduction in symptoms.    Objective Measurements:  Objective Measure:  strength deferred due to hand injury.     Objective Measure: Cervical ROM measurements   Details: Flexion 40 degrees, ext 60, left lateral 30, right lateral 30, left 70, right 66 (Improved rotation ROM bilaterally)         Goals:  Goal Identifier Sleep   Goal Description Sleep through the night with no sleep disruption due to pain.   Target Date 18   Date Met      Progress: Progressing     Goal Identifier Packing boxes at work   Goal Description Patient will tolerate 6-8 hours of work rating pain less then 3/10 consistently.   Target Date 18   Date Met      Progress: Progressing     Goal Identifier HEP   Goal Description Independent and compliant with advanced HEP   Target Date 18   Date Met      Progress:  Progressing       Progress Toward Goals:   Decrease in pain, improved sleep with less discomfort, improved ROM of cervical spine.      Plan:  Continue therapy per current plan of care.  Recommend Gutierrez home cervical traction unit to self manage neck pain and radicular symptoms.    Discharge:  No

## 2018-07-30 ENCOUNTER — HOSPITAL ENCOUNTER (OUTPATIENT)
Dept: PHYSICAL THERAPY | Facility: OTHER | Age: 63
Setting detail: THERAPIES SERIES
End: 2018-07-30
Attending: NURSE PRACTITIONER
Payer: COMMERCIAL

## 2018-07-30 PROCEDURE — 97012 MECHANICAL TRACTION THERAPY: CPT | Mod: GP | Performed by: PHYSICAL THERAPIST

## 2018-07-30 PROCEDURE — 40000185 ZZHC STATISTIC PT OUTPT VISIT: Performed by: PHYSICAL THERAPIST

## 2018-08-06 ENCOUNTER — HOSPITAL ENCOUNTER (OUTPATIENT)
Dept: PHYSICAL THERAPY | Facility: OTHER | Age: 63
Setting detail: THERAPIES SERIES
End: 2018-08-06
Attending: NURSE PRACTITIONER
Payer: COMMERCIAL

## 2018-08-06 PROCEDURE — 40000185 ZZHC STATISTIC PT OUTPT VISIT: Performed by: PHYSICAL THERAPIST

## 2018-08-06 PROCEDURE — 97012 MECHANICAL TRACTION THERAPY: CPT | Mod: GP | Performed by: PHYSICAL THERAPIST

## 2018-08-06 NOTE — PROGRESS NOTES
"Outpatient Physical Therapy Progress Note     Patient: Delvin Gray  : 1955    Beginning/End Dates of Reporting Period:  18 to 2018 (13 of 14 visits)    Referring Provider: Jen Galvan CNP    Therapy Diagnosis: Neck pain and muscle spasms, bilaterally radicular arm pain, limited cervical ROM.     Client Self Report: Patient reports significant relief from the cervical traction treatments.  He is sleeping \"much better\" with less pain in the neck and less radiating arm pain.  The last few PT sessions we have used the [facility] cervical traction unit as a trial.  He is getting similar relief to the clinic model.  He states he would like to pursue getting one to use at home to help manage his pain and possibly reduce the amount of medications he is taking.  Overall he states he has had 70-80% reduction in symptoms.    Objective Measurements:  Objective Measure:  strength deferred due to hand injury.     Objective Measure: Cervical ROM measurements from 18  Details: Flexion 40 degrees, ext 60, left lateral 30, right lateral 30, left 70, right 66 (Improved rotation ROM bilaterally)       Goals:  Goal Identifier Sleep   Goal Description Sleep through the night with no sleep disruption due to pain.   Target Date 18   Date Met      Progress: Progressing, goal not yet met     Goal Identifier Packing boxes at work   Goal Description Patient will tolerate 6-8 hours of work rating pain less then 3/10 consistently.   Target Date 18   Date Met      Progress: Progressing, goal not yet met     Goal Identifier HEP   Goal Description Independent and compliant with advanced HEP   Target Date 18   Date Met   18   Progress: Goal met         Progress Toward Goals:   Progress this reporting period: Improved neck pain, improved cramping of neck musculature, decreased radiating arm pain bilaterally, improved neck ROM, decreased posterior shoulder/shoulder blade " pain.      Plan:  Recommend [facility] home cervical traction unit to help manage symptoms long term.  Trial of Gutierrez home traction has been completed and patient continues to benefit from this treatment.  Request a [facility] home cervical traction unit be sent to the patient.    Discharge:  No

## 2019-04-24 LAB
ALT SERPL-CCNC: 45 U/L (ref 13–61)
AST SERPL-CCNC: 28 U/L (ref 15–37)
CHOLEST SERPL-MCNC: 165 MG/DL (ref 0–200)
CREAT SERPL-MCNC: 1 MG/DL (ref 0.7–1.2)
GFR SERPL CREATININE-BSD FRML MDRD: >60 ML/MIN/1.73M^2
GLUCOSE SERPL-MCNC: 100 MG/DL (ref 74–106)
HDLC SERPL-MCNC: 31 MG/DL
LDLC SERPL CALC-MCNC: 96 MG/DL
NONHDLC SERPL-MCNC: 134 MG/DL
POTASSIUM SERPL-SCNC: 4.7 MMOL/L (ref 3.5–5)
TRIGL SERPL-MCNC: 188 MG/DL (ref 0–150)

## 2019-04-29 DIAGNOSIS — R00.2 PALPITATIONS: Primary | ICD-10-CM

## 2019-05-13 ENCOUNTER — TELEPHONE (OUTPATIENT)
Dept: CARDIOLOGY | Facility: OTHER | Age: 64
End: 2019-05-13

## 2019-05-14 ENCOUNTER — HOSPITAL ENCOUNTER (OUTPATIENT)
Dept: NUCLEAR MEDICINE | Facility: OTHER | Age: 64
Discharge: HOME | End: 2019-05-14
Attending: NURSE PRACTITIONER | Admitting: NURSE PRACTITIONER
Payer: COMMERCIAL

## 2019-05-14 ENCOUNTER — HOSPITAL ENCOUNTER (OUTPATIENT)
Dept: NUCLEAR MEDICINE | Facility: OTHER | Age: 64
Discharge: HOME | End: 2019-05-14
Attending: NURSE PRACTITIONER | Admitting: FAMILY MEDICINE
Payer: COMMERCIAL

## 2019-05-14 VITALS — HEIGHT: 65 IN | BODY MASS INDEX: 36.15 KG/M2 | WEIGHT: 217 LBS

## 2019-05-14 DIAGNOSIS — R00.2 PALPITATIONS: ICD-10-CM

## 2019-05-14 PROCEDURE — 34300033 ZZH RX 343

## 2019-05-14 PROCEDURE — 93016 CV STRESS TEST SUPVJ ONLY: CPT | Performed by: INTERNAL MEDICINE

## 2019-05-14 PROCEDURE — 93017 CV STRESS TEST TRACING ONLY: CPT

## 2019-05-14 PROCEDURE — A9500 TC99M SESTAMIBI: HCPCS | Performed by: NURSE PRACTITIONER

## 2019-05-14 PROCEDURE — 93018 CV STRESS TEST I&R ONLY: CPT | Performed by: INTERNAL MEDICINE

## 2019-05-14 PROCEDURE — A9500 TC99M SESTAMIBI: HCPCS

## 2019-05-14 PROCEDURE — 78452 HT MUSCLE IMAGE SPECT MULT: CPT

## 2019-05-14 PROCEDURE — 34300033 ZZH RX 343: Performed by: NURSE PRACTITIONER

## 2019-05-14 RX ADMIN — KIT FOR THE PREPARATION OF TECHNETIUM TC99M SESTAMIBI 8.61 MILLICURIE: 1 INJECTION, POWDER, LYOPHILIZED, FOR SOLUTION PARENTERAL at 08:10

## 2019-05-14 RX ADMIN — KIT FOR THE PREPARATION OF TECHNETIUM TC99M SESTAMIBI 38.5 MILLICURIE: 1 INJECTION, POWDER, LYOPHILIZED, FOR SOLUTION PARENTERAL at 10:10

## 2019-05-14 ASSESSMENT — MIFFLIN-ST. JEOR: SCORE: 1716.19

## 2019-05-14 NOTE — PROGRESS NOTES
0800 The patient arrived for a Cardiolite stress test. The procedure, risks, and benefits were discussed with the patient , and the consent was signed. A saline lock was started, and the Cardiolite was injected by x-ray. The patient was taken to the waiting area, to await resting images at  0820.   0935 The patient returned from x-ray, and was prepped for the stress test.  Angela Chauhan NP arrived, and the patient walked 7 minutes and 30 seconds. The patient tolerated the procedure well. He was given a snack and taken to x-ray in stable condition, for stress images. The saline lock will be removed by x-ray for proper disposal. Please see the chart for the complete test results

## 2019-07-09 ENCOUNTER — TRANSFERRED RECORDS (OUTPATIENT)
Dept: HEALTH INFORMATION MANAGEMENT | Facility: OTHER | Age: 64
End: 2019-07-09

## 2019-07-09 LAB
CREAT SERPL-MCNC: 1 MG/DL (ref 0.7–1.2)
GFR SERPL CREATININE-BSD FRML MDRD: >60 ML/MIN/1.73M^2
POTASSIUM SERPL-SCNC: 4.8 MMOL/L (ref 3.5–5)

## 2019-08-15 ENCOUNTER — TELEPHONE (OUTPATIENT)
Dept: SURGERY | Facility: OTHER | Age: 64
End: 2019-08-15

## 2019-08-15 DIAGNOSIS — Z00.00 HEALTHCARE MAINTENANCE: ICD-10-CM

## 2019-08-15 DIAGNOSIS — Z98.890 POSTOPERATIVE STATE: Primary | ICD-10-CM

## 2019-08-15 NOTE — TELEPHONE ENCOUNTER
Screening Questions for the Scheduling of Screening Colonoscopies   (If Colonoscopy is diagnostic, Provider should review the chart before scheduling.)  Are you younger than 50 or older than 80?  NO   Do you take aspirin or fish oil?  YES  - ASPIRIN  (if yes, tell patient to stop 1 week prior to Colonoscopy)  Do you take warfarin (Coumadin), clopidogrel (Plavix), apixaban (Eliquis), dabigatram (Pradaxa), rivaroxaban (Xarelto) or any blood thinner? NO   Do you use oxygen at home?  NO   Do you have kidney disease? NO   Are you on dialysis? NO   Have you had a stroke or heart attack in the last year? NO   Have you had a stent in your heart or any blood vessel in the last year? NO   Have you had a transplant of any organ? NO   Have you had a colonoscopy or upper endoscopy (EGD) before? YES          When?  2010  Date of scheduled Colonoscopy. 09/23/2019  Provider JOELLE   Pharmacy [facility]

## 2019-08-15 NOTE — TELEPHONE ENCOUNTER
[facility] in may 2019,  Colonoscopy could not be scheduled due to having some heart issues.  Cardiology work up has been done . Please advise if colonoscopy can be scheduled at this time. Notes have been sent .  Thank you. Lissett Aponte on 8/15/2019 at 10:57 AM

## 2019-08-19 RX ORDER — BISACODYL 5 MG
TABLET, DELAYED RELEASE (ENTERIC COATED) ORAL
Qty: 2 TABLET | Refills: 0 | Status: SHIPPED | OUTPATIENT
Start: 2019-08-19 | End: 2019-12-09

## 2019-08-19 RX ORDER — POLYETHYLENE GLYCOL 3350, SODIUM CHLORIDE, SODIUM BICARBONATE, POTASSIUM CHLORIDE 420; 11.2; 5.72; 1.48 G/4L; G/4L; G/4L; G/4L
4000 POWDER, FOR SOLUTION ORAL ONCE
Qty: 4000 ML | Refills: 0 | Status: ON HOLD | OUTPATIENT
Start: 2019-08-19 | End: 2019-12-09

## 2019-09-19 PROBLEM — Z86.0101 H/O ADENOMATOUS POLYP OF COLON: Status: ACTIVE | Noted: 2019-09-19

## 2019-09-23 ENCOUNTER — HOSPITAL ENCOUNTER (OUTPATIENT)
Facility: OTHER | Age: 64
Discharge: HOME | End: 2019-09-23
Attending: SURGERY | Admitting: SURGERY
Payer: COMMERCIAL

## 2019-09-23 ENCOUNTER — ANESTHESIA (OUTPATIENT)
Dept: SURGERY | Facility: OTHER | Age: 64
End: 2019-09-23
Payer: COMMERCIAL

## 2019-09-23 ENCOUNTER — ANESTHESIA EVENT (OUTPATIENT)
Dept: SURGERY | Facility: OTHER | Age: 64
End: 2019-09-23
Payer: COMMERCIAL

## 2019-09-23 VITALS
DIASTOLIC BLOOD PRESSURE: 60 MMHG | RESPIRATION RATE: 16 BRPM | HEART RATE: 92 BPM | SYSTOLIC BLOOD PRESSURE: 135 MMHG | HEIGHT: 65 IN | TEMPERATURE: 96.8 F | OXYGEN SATURATION: 96 % | BODY MASS INDEX: 36.11 KG/M2

## 2019-09-23 PROBLEM — K63.5 COLON POLYPS: Status: ACTIVE | Noted: 2019-09-23

## 2019-09-23 PROCEDURE — 45384 COLONOSCOPY W/LESION REMOVAL: CPT | Mod: PT | Performed by: SURGERY

## 2019-09-23 PROCEDURE — 25800030 ZZH RX IP 258 OP 636: Performed by: SURGERY

## 2019-09-23 PROCEDURE — 45380 COLONOSCOPY AND BIOPSY: CPT | Performed by: SURGERY

## 2019-09-23 PROCEDURE — 45385 COLONOSCOPY W/LESION REMOVAL: CPT | Mod: PT | Performed by: SURGERY

## 2019-09-23 PROCEDURE — 45384 COLONOSCOPY W/LESION REMOVAL: CPT | Mod: PT,XU

## 2019-09-23 PROCEDURE — 40000010 ZZH STATISTIC ANES STAT CODE-CRNA PER MINUTE: Performed by: SURGERY

## 2019-09-23 PROCEDURE — 45385 COLONOSCOPY W/LESION REMOVAL: CPT | Performed by: NURSE ANESTHETIST, CERTIFIED REGISTERED

## 2019-09-23 PROCEDURE — 88305 TISSUE EXAM BY PATHOLOGIST: CPT

## 2019-09-23 PROCEDURE — 25000128 H RX IP 250 OP 636: Performed by: NURSE ANESTHETIST, CERTIFIED REGISTERED

## 2019-09-23 PROCEDURE — 25000125 ZZHC RX 250: Performed by: SURGERY

## 2019-09-23 PROCEDURE — 25000125 ZZHC RX 250: Performed by: NURSE ANESTHETIST, CERTIFIED REGISTERED

## 2019-09-23 RX ORDER — SODIUM CHLORIDE, SODIUM LACTATE, POTASSIUM CHLORIDE, CALCIUM CHLORIDE 600; 310; 30; 20 MG/100ML; MG/100ML; MG/100ML; MG/100ML
INJECTION, SOLUTION INTRAVENOUS CONTINUOUS
Status: DISCONTINUED | OUTPATIENT
Start: 2019-09-23 | End: 2019-09-23 | Stop reason: HOSPADM

## 2019-09-23 RX ORDER — ONDANSETRON 2 MG/ML
4 INJECTION INTRAMUSCULAR; INTRAVENOUS
Status: DISCONTINUED | OUTPATIENT
Start: 2019-09-23 | End: 2019-09-23 | Stop reason: HOSPADM

## 2019-09-23 RX ORDER — PROPOFOL 10 MG/ML
INJECTION, EMULSION INTRAVENOUS CONTINUOUS PRN
Status: DISCONTINUED | OUTPATIENT
Start: 2019-09-23 | End: 2019-09-23

## 2019-09-23 RX ORDER — DOXYCYCLINE HYCLATE 100 MG
100 TABLET ORAL 2 TIMES DAILY
COMMUNITY
End: 2019-12-09

## 2019-09-23 RX ORDER — LIDOCAINE 40 MG/G
CREAM TOPICAL
Status: DISCONTINUED | OUTPATIENT
Start: 2019-09-23 | End: 2019-09-23 | Stop reason: HOSPADM

## 2019-09-23 RX ORDER — NALOXONE HYDROCHLORIDE 0.4 MG/ML
.1-.4 INJECTION, SOLUTION INTRAMUSCULAR; INTRAVENOUS; SUBCUTANEOUS
Status: DISCONTINUED | OUTPATIENT
Start: 2019-09-23 | End: 2019-09-23 | Stop reason: HOSPADM

## 2019-09-23 RX ORDER — PREDNISONE 20 MG/1
20 TABLET ORAL DAILY
COMMUNITY
End: 2019-12-09

## 2019-09-23 RX ORDER — FLUMAZENIL 0.1 MG/ML
0.2 INJECTION, SOLUTION INTRAVENOUS
Status: DISCONTINUED | OUTPATIENT
Start: 2019-09-23 | End: 2019-09-23 | Stop reason: HOSPADM

## 2019-09-23 RX ORDER — LIDOCAINE HYDROCHLORIDE 20 MG/ML
INJECTION, SOLUTION INFILTRATION; PERINEURAL PRN
Status: DISCONTINUED | OUTPATIENT
Start: 2019-09-23 | End: 2019-09-23

## 2019-09-23 RX ORDER — PROPOFOL 10 MG/ML
INJECTION, EMULSION INTRAVENOUS PRN
Status: DISCONTINUED | OUTPATIENT
Start: 2019-09-23 | End: 2019-09-23

## 2019-09-23 RX ADMIN — PROPOFOL 50 MG: 10 INJECTION, EMULSION INTRAVENOUS at 12:33

## 2019-09-23 RX ADMIN — PROPOFOL 90 MG: 10 INJECTION, EMULSION INTRAVENOUS at 11:51

## 2019-09-23 RX ADMIN — PROPOFOL 30 MG: 10 INJECTION, EMULSION INTRAVENOUS at 12:11

## 2019-09-23 RX ADMIN — PROPOFOL 40 MG: 10 INJECTION, EMULSION INTRAVENOUS at 12:19

## 2019-09-23 RX ADMIN — PROPOFOL 50 MG: 10 INJECTION, EMULSION INTRAVENOUS at 12:29

## 2019-09-23 RX ADMIN — LIDOCAINE HYDROCHLORIDE 60 MG: 20 INJECTION, SOLUTION INFILTRATION; PERINEURAL at 11:51

## 2019-09-23 RX ADMIN — PROPOFOL 50 MG: 10 INJECTION, EMULSION INTRAVENOUS at 12:27

## 2019-09-23 RX ADMIN — PROPOFOL 50 MG: 10 INJECTION, EMULSION INTRAVENOUS at 12:03

## 2019-09-23 RX ADMIN — PROPOFOL 140 MCG/KG/MIN: 10 INJECTION, EMULSION INTRAVENOUS at 11:51

## 2019-09-23 RX ADMIN — SODIUM CHLORIDE, POTASSIUM CHLORIDE, SODIUM LACTATE AND CALCIUM CHLORIDE: 600; 310; 30; 20 INJECTION, SOLUTION INTRAVENOUS at 11:40

## 2019-09-23 NOTE — H&P
History and Physical    CHIEF COMPLAINT / REASON FOR PROCEDURE:  H/o polyps    PERTINENT HISTORY   Patient is a 64 year old male who presents today for colonoscopy for h/o polyps.   Last colonoscopy about 5 years ago.  Patient has no complaints.    History reviewed. No pertinent past medical history.  History reviewed. No pertinent surgical history.    Bleeding tendencies:  No    ALLERGIES/SENSITIVITIES:   Allergies   Allergen Reactions     Seasonal Allergies      Other reaction(s): Other - Describe In Comment Field  SEASONAL, Reaction: runny nose and watery eyes        CURRENT MEDICATIONS:    Prior to Admission medications    Medication Sig Start Date End Date Taking? Authorizing Provider   artificial tears OINT ophthalmic ointment At Bedtime    Reported, Patient   bisacodyl (DULCOLAX) 5 MG EC tablet Use as directed per colonoscopy prep. 8/19/19   Arturo Wilcox MD   carboxymethylcellulose (CELLUVISC/REFRESH LIQUIGEL) 1 % ophthalmic solution 1 drop 3 times daily    Reported, Patient   fluticasone (FLONASE) 50 MCG/ACT spray Spray 1 spray into both nostrils daily 8/30/12   Reported, Patient   fluticasone (FLONASE) 50 MCG/ACT spray Spray 1 spray into both nostrils daily    Reported, Patient   gabapentin (NEURONTIN) 300 MG capsule Take 300 mg by mouth 3 times daily    Reported, Patient   GABAPENTIN PO Take by mouth 3 times daily Pt takes 400 mg in am, 400 mg in elin, and 1200 mg at hs    Reported, Patient   hypromellose (GENTEAL) 0.3 % SOLN ophthalmic solution 1 drop every hour as needed for dry eyes    Reported, Patient   lidocaine (LMX4) 4 % CREA cream Apply topically once as needed for mild pain    Reported, Patient   loratadine (CLARITIN REDITABS) 10 MG ODT tab Take 10 mg by mouth daily    Reported, Patient   loratadine (CLARITIN) 10 MG tablet Take 10 mg by mouth daily 8/30/12   Reported, Patient   naproxen (NAPROSYN) 500 MG tablet Take 500 mg by mouth 2 times daily (with meals) 8/30/12   Reported, Patient   NAPROXEN  PO Take 500 mg by mouth 2 times daily (with meals)    Reported, Patient   omeprazole (PRILOSEC) 20 MG CR capsule Take 20 mg by mouth daily Before a meal 8/30/12   Reported, Patient   OMEPRAZOLE PO Take 20 mg by mouth 2 times daily (before meals)    Reported, Patient   polyethylene glycol (MIRALAX/GLYCOLAX) powder Take 17 g by mouth daily as needed for constipation 8/30/12   Reported, Patient   polyethylene glycol-electrolytes (NULYTELY) 420 g solution Take 4,000 mLs by mouth once for 1 dose 8/19/19 8/19/19  Arturo Wilcox MD   rOPINIRole (REQUIP) 1 MG tablet Take 1 mg by mouth 2 times daily    Reported, Patient   ROPINIROLE HCL PO Take 1 mg by mouth At Bedtime    Reported, Patient       Physical Exam:  There were no vitals taken for this visit.  EXAM:  Chest/Respiratory Exam: Normal - Clear to auscultation without rales, rhonchi, or wheezing.  Cardiovascular Exam: normal, regular rate and rhythm        PLAN: COLONOSCOPY .  Patient understands risks of bleeding, perforation, potential inability to reach cecum, aspiration and wishes to proceed. MAC needed for SOPHIA.

## 2019-09-23 NOTE — ANESTHESIA PREPROCEDURE EVALUATION
Anesthesia Pre-Procedure Evaluation    Patient: Delvin Gray   MRN: [id number] : 1955          Preoperative Diagnosis: hx of Polyps    Procedure(s):  COLONOSCOPY    History reviewed. No pertinent past medical history.  History reviewed. No pertinent surgical history.    Anesthesia Evaluation     . Pt has had prior anesthetic. Type: General and MAC           ROS/MED HX    ENT/Pulmonary:     (+)allergic rhinitis, , . .    Neurologic:  - neg neurologic ROS     Cardiovascular:  - neg cardiovascular ROS       METS/Exercise Tolerance:  >4 METS   Hematologic:  - neg hematologic  ROS       Musculoskeletal:  - neg musculoskeletal ROS       GI/Hepatic:         Renal/Genitourinary:  - ROS Renal section negative       Endo:  - neg endo ROS       Psychiatric:  - neg psychiatric ROS       Infectious Disease:  - neg infectious disease ROS       Malignancy:      - no malignancy   Other:    - neg other ROS                      Physical Exam  Normal systems: cardiovascular, pulmonary and dental    Airway   Mallampati: III  TM distance: >3 FB  Neck ROM: full    Dental     Cardiovascular   Rhythm and rate: regular and normal      Pulmonary    breath sounds clear to auscultation            Lab Results   Component Value Date    WBC 5.5 2017    HGB 13.5 2017    HCT 40.1 2017     2017     2018    POTASSIUM 4.3 2018    CHLORIDE 106 2018    CO2 26 2018    BUN 22 2018    CR 1.11 2018    GLC 94 2018    LIAT 9.4 2018    ALBUMIN 4.1 2015    PROTTOTAL 7.2 2015    ALKPHOS 101 2015    BILITOTAL 0.3 2015    LIPASE 39.0 2015       Preop Vitals  BP Readings from Last 3 Encounters:   18 (!) 165/92   17 154/99   12 118/78    Pulse Readings from Last 3 Encounters:   18 78   17 73   12 60      Resp Readings from Last 3 Encounters:   18 16   17 14    SpO2 Readings from Last 3  "Encounters:   07/18/18 94%   02/28/17 97%      Temp Readings from Last 1 Encounters:   07/18/18 98.5  F (36.9  C) (Tympanic)    Ht Readings from Last 1 Encounters:   05/14/19 1.651 m (5' 5\")      Wt Readings from Last 1 Encounters:   05/14/19 98.4 kg (217 lb)    Estimated body mass index is 36.11 kg/m  as calculated from the following:    Height as of 5/14/19: 1.651 m (5' 5\").    Weight as of 5/14/19: 98.4 kg (217 lb).       Anesthesia Plan      History & Physical Review      ASA Status:  2 .    NPO Status:  > 4 hours    Plan for MAC with Propofol and Intravenous induction. Maintenance will be Balanced.           Postoperative Care      Consents  Anesthetic plan, risks, benefits and alternatives discussed with:  Patient..                 David Kellerman, APRN CRNA  "

## 2019-09-23 NOTE — OP NOTE
PROCEDURE NOTE    DATE OF SERVICE: 9/23/2019    SURGEON: Arturo Wilcox MD    PRE-OP DIAGNOSIS:    History of Polyps    POST-OP DIAGNOSIS:  Same  Polyps at cecum, HF and mid TC    PROCEDURE:     Colonoscopy with snare polypectomy and hot biopsy    ANESTHESIA:  JOSELUIS Macdonald CRNA    INDICATION FOR THE PROCEDURE: The patient is a 64 year old male with h/o polyps . The patient has no other complaints  . After explaining the risks to include bleeding, perforation, potential inability toreach the cecum, the patient wished to proceed.    PROCEDURE:After adequate sedation, the patient was in the left lateral decubitus position.  Rectal exam was performed.  There was normal tone and no palpable masses .  The colonoscope was introduced into the rectum and advanced to the cecum with Mild difficulty.  The patient's prep was good.  The terminal cecum was reached.  The cecum, ascending, transverse, descending and sigmoid colon was with flat polyps each under 1 cm at cecum, HF and mid TC that were removed. Cecum was completely removed by snare. HF was with combination of snare and hot biopsy. Mid Tc was an ulcerated flat polyp that was photographed and marked with ink. It was removed by hot biopsy and snare . All polypectomies were difficult due to involuntary jerking movements of patient and colon. The scope was retroflexed in the rectum.  The rectum was unremarkable  .  The scope was straightened and removed.  The patient tolerated the procedure well.     ESTIMATED BLOOD LOSS: none    COMPLICATIONS:  None    TISSUE REMOVED:  Yes    RECOMMEND:        Follow-up pending pathology      Arturo Wilcox MD FACS

## 2019-09-23 NOTE — ANESTHESIA CARE TRANSFER NOTE
Patient: Delvin Gray    Procedure(s):  COLONOSCOPY, WITH POLYPECTOMY AND BIOPSY    Diagnosis: hx of Polyps  Diagnosis Additional Information: No value filed.    Anesthesia Type:   MAC     Note:  Airway :Nasal Cannula  Patient transferred to:Phase II  Handoff Report: Identifed the Patient, Identified the Reponsible Provider, Reviewed the pertinent medical history, Discussed the surgical course, Reviewed Intra-OP anesthesia mangement and issues during anesthesia, Set expectations for post-procedure period and Allowed opportunity for questions and acknowledgement of understanding      Vitals: (Last set prior to Anesthesia Care Transfer)    CRNA VITALS  9/23/2019 1208 - 9/23/2019 1238      9/23/2019             Pulse:  90    Ht Rate:  90    SpO2:  94 %    Resp Rate (set):  10                Electronically Signed By: JOHN Flaherty CRNA  September 23, 2019  12:38 PM

## 2019-09-23 NOTE — ANESTHESIA POSTPROCEDURE EVALUATION
Patient: Delvin Gray    Procedure(s):  COLONOSCOPY, WITH POLYPECTOMY AND BIOPSY    Diagnosis:hx of Polyps  Diagnosis Additional Information: No value filed.    Anesthesia Type:  MAC    Note:  Anesthesia Post Evaluation    Patient location during evaluation: Phase 2  Patient participation: Able to fully participate in evaluation  Level of consciousness: awake and alert  Pain management: adequate  Airway patency: patent  Cardiovascular status: acceptable  Respiratory status: acceptable  Hydration status: euvolemic  PONV: none             Last vitals:  Vitals:    09/23/19 1240 09/23/19 1245 09/23/19 1300   BP: 114/69 116/70 120/84   Pulse: 91 91 86   Resp: 16     Temp: 96.2  F (35.7  C)     SpO2: 94% 93%          Electronically Signed By: David Kellerman, APRN CRNA  September 23, 2019  1:15 PM

## 2019-09-23 NOTE — DISCHARGE INSTRUCTIONS
[facility]  Adult Discharge Orders & Instructions    ________________________________________________________________          For 12 hours after surgery  1. Get plenty of rest.  A responsible adult must stay with you for at least 12 hours after you leave the hospital.   2. You may feel lightheaded.  IF so, sit for a few minutes before standing.  Have someone help you get up.   3. You may have a slight fever. Call the doctor if your fever is over 101 F (38.3 C) (taken under the tongue) or lasts longer than 24 hours.  4. You may have a dry mouth, a sore throat, muscle aches or trouble sleeping.  These should go away after 24 hours.  5. Do not make important or legal decisions.  6.   Do not drive or use heavy equipment.  If you have weakness or tingling, don't drive or use heavy equipment until this feeling goes away.    To contact a doctor, call   [phone]_______________________

## 2019-09-26 PROBLEM — K63.5 COLON POLYPS: Status: RESOLVED | Noted: 2019-09-23 | Resolved: 2019-09-26

## 2019-09-26 PROBLEM — K63.3 ULCERATION OF COLON: Status: ACTIVE | Noted: 2019-09-26

## 2019-10-04 ENCOUNTER — HEALTH MAINTENANCE LETTER (OUTPATIENT)
Age: 64
End: 2019-10-04

## 2019-12-09 ENCOUNTER — APPOINTMENT (OUTPATIENT)
Dept: CT IMAGING | Facility: OTHER | Age: 64
End: 2019-12-09
Attending: FAMILY MEDICINE
Payer: COMMERCIAL

## 2019-12-09 ENCOUNTER — HOSPITAL ENCOUNTER (OUTPATIENT)
Facility: OTHER | Age: 64
Setting detail: OBSERVATION
Discharge: HOME | End: 2019-12-10
Attending: FAMILY MEDICINE | Admitting: INTERNAL MEDICINE
Payer: COMMERCIAL

## 2019-12-09 DIAGNOSIS — Z87.891 PERSONAL HISTORY OF TOBACCO USE, PRESENTING HAZARDS TO HEALTH: ICD-10-CM

## 2019-12-09 DIAGNOSIS — Z79.899 ENCOUNTER FOR LONG-TERM (CURRENT) USE OF OTHER MEDICATIONS: ICD-10-CM

## 2019-12-09 DIAGNOSIS — I26.99 ACUTE PULMONARY EMBOLISM, UNSPECIFIED PULMONARY EMBOLISM TYPE, UNSPECIFIED WHETHER ACUTE COR PULMONALE PRESENT (H): Primary | ICD-10-CM

## 2019-12-09 DIAGNOSIS — I26.99 BILATERAL PULMONARY EMBOLISM (H): ICD-10-CM

## 2019-12-09 LAB
ALBUMIN SERPL-MCNC: 4.3 G/DL (ref 3.5–5.7)
ALP SERPL-CCNC: 86 U/L (ref 34–104)
ALT SERPL W P-5'-P-CCNC: 34 U/L (ref 7–52)
ANION GAP SERPL CALCULATED.3IONS-SCNC: 6 MMOL/L (ref 3–14)
AST SERPL W P-5'-P-CCNC: 28 U/L (ref 13–39)
BASOPHILS # BLD AUTO: 0 10E9/L (ref 0–0.2)
BASOPHILS NFR BLD AUTO: 0.4 %
BILIRUB SERPL-MCNC: 0.4 MG/DL (ref 0.3–1)
BUN SERPL-MCNC: 36 MG/DL (ref 7–25)
CALCIUM SERPL-MCNC: 9.1 MG/DL (ref 8.6–10.3)
CHLORIDE SERPL-SCNC: 104 MMOL/L (ref 98–107)
CO2 SERPL-SCNC: 27 MMOL/L (ref 21–31)
CREAT SERPL-MCNC: 1.16 MG/DL (ref 0.7–1.3)
DIFFERENTIAL METHOD BLD: ABNORMAL
EOSINOPHIL # BLD AUTO: 0.1 10E9/L (ref 0–0.7)
EOSINOPHIL NFR BLD AUTO: 1.1 %
ERYTHROCYTE [DISTWIDTH] IN BLOOD BY AUTOMATED COUNT: 13.7 % (ref 10–15)
GFR SERPL CREATININE-BSD FRML MDRD: 64 ML/MIN/{1.73_M2}
GLUCOSE SERPL-MCNC: 100 MG/DL (ref 70–105)
HCT VFR BLD AUTO: 44.4 % (ref 40–53)
HGB BLD-MCNC: 14.7 G/DL (ref 13.3–17.7)
IMM GRANULOCYTES # BLD: 0 10E9/L (ref 0–0.4)
IMM GRANULOCYTES NFR BLD: 0.2 %
LYMPHOCYTES # BLD AUTO: 0.7 10E9/L (ref 0.8–5.3)
LYMPHOCYTES NFR BLD AUTO: 16.3 %
MCH RBC QN AUTO: 29.5 PG (ref 26.5–33)
MCHC RBC AUTO-ENTMCNC: 33.1 G/DL (ref 31.5–36.5)
MCV RBC AUTO: 89 FL (ref 78–100)
MONOCYTES # BLD AUTO: 0.6 10E9/L (ref 0–1.3)
MONOCYTES NFR BLD AUTO: 12.8 %
NEUTROPHILS # BLD AUTO: 3.1 10E9/L (ref 1.6–8.3)
NEUTROPHILS NFR BLD AUTO: 69.2 %
PLATELET # BLD AUTO: 189 10E9/L (ref 150–450)
POTASSIUM SERPL-SCNC: 4.3 MMOL/L (ref 3.5–5.1)
PROT SERPL-MCNC: 7 G/DL (ref 6.4–8.9)
RADIOLOGIST FLAGS: ABNORMAL
RBC # BLD AUTO: 4.99 10E12/L (ref 4.4–5.9)
SODIUM SERPL-SCNC: 137 MMOL/L (ref 134–144)
TROPONIN I SERPL-MCNC: 6.9 PG/ML
TROPONIN I SERPL-MCNC: 7.3 PG/ML
WBC # BLD AUTO: 4.5 10E9/L (ref 4–11)

## 2019-12-09 PROCEDURE — 25000128 H RX IP 250 OP 636: Performed by: FAMILY MEDICINE

## 2019-12-09 PROCEDURE — 99219 ZZC INITIAL OBSERVATION CARE,LEVL II: CPT | Performed by: INTERNAL MEDICINE

## 2019-12-09 PROCEDURE — 25800030 ZZH RX IP 258 OP 636: Performed by: FAMILY MEDICINE

## 2019-12-09 PROCEDURE — 93010 ELECTROCARDIOGRAM REPORT: CPT | Performed by: INTERNAL MEDICINE

## 2019-12-09 PROCEDURE — 80053 COMPREHEN METABOLIC PANEL: CPT | Performed by: FAMILY MEDICINE

## 2019-12-09 PROCEDURE — 71275 CT ANGIOGRAPHY CHEST: CPT

## 2019-12-09 PROCEDURE — 93005 ELECTROCARDIOGRAM TRACING: CPT | Performed by: FAMILY MEDICINE

## 2019-12-09 PROCEDURE — 96374 THER/PROPH/DIAG INJ IV PUSH: CPT | Mod: XU | Performed by: FAMILY MEDICINE

## 2019-12-09 PROCEDURE — 36415 COLL VENOUS BLD VENIPUNCTURE: CPT | Performed by: FAMILY MEDICINE

## 2019-12-09 PROCEDURE — 85025 COMPLETE CBC W/AUTO DIFF WBC: CPT | Performed by: FAMILY MEDICINE

## 2019-12-09 PROCEDURE — 25000132 ZZH RX MED GY IP 250 OP 250 PS 637: Performed by: FAMILY MEDICINE

## 2019-12-09 PROCEDURE — 99285 EMERGENCY DEPT VISIT HI MDM: CPT | Mod: 25 | Performed by: FAMILY MEDICINE

## 2019-12-09 PROCEDURE — 25000132 ZZH RX MED GY IP 250 OP 250 PS 637: Performed by: INTERNAL MEDICINE

## 2019-12-09 PROCEDURE — 25500064 ZZH RX 255 OP 636: Performed by: FAMILY MEDICINE

## 2019-12-09 PROCEDURE — 99285 EMERGENCY DEPT VISIT HI MDM: CPT | Mod: Z6 | Performed by: FAMILY MEDICINE

## 2019-12-09 PROCEDURE — G0378 HOSPITAL OBSERVATION PER HR: HCPCS

## 2019-12-09 PROCEDURE — 84484 ASSAY OF TROPONIN QUANT: CPT | Mod: 91 | Performed by: FAMILY MEDICINE

## 2019-12-09 RX ORDER — ROPINIROLE 1 MG/1
3 TABLET, FILM COATED ORAL AT BEDTIME
Status: DISCONTINUED | OUTPATIENT
Start: 2019-12-09 | End: 2019-12-10 | Stop reason: HOSPADM

## 2019-12-09 RX ORDER — GABAPENTIN 400 MG/1
1200 CAPSULE ORAL AT BEDTIME
COMMUNITY
End: 2024-03-21

## 2019-12-09 RX ORDER — ROPINIROLE 1 MG/1
1 TABLET, FILM COATED ORAL AT BEDTIME
Status: DISCONTINUED | OUTPATIENT
Start: 2019-12-09 | End: 2019-12-09 | Stop reason: DRUGHIGH

## 2019-12-09 RX ORDER — LIDOCAINE 40 MG/G
CREAM TOPICAL
Status: DISCONTINUED | OUTPATIENT
Start: 2019-12-09 | End: 2019-12-10 | Stop reason: HOSPADM

## 2019-12-09 RX ORDER — FENTANYL CITRATE 50 UG/ML
50 INJECTION, SOLUTION INTRAMUSCULAR; INTRAVENOUS ONCE
Status: COMPLETED | OUTPATIENT
Start: 2019-12-09 | End: 2019-12-09

## 2019-12-09 RX ORDER — HEPARIN SODIUM 10000 [USP'U]/100ML
0-3500 INJECTION, SOLUTION INTRAVENOUS CONTINUOUS
Status: DISCONTINUED | OUTPATIENT
Start: 2019-12-09 | End: 2019-12-09

## 2019-12-09 RX ORDER — ONDANSETRON 4 MG/1
4 TABLET, ORALLY DISINTEGRATING ORAL EVERY 6 HOURS PRN
Status: DISCONTINUED | OUTPATIENT
Start: 2019-12-09 | End: 2019-12-10 | Stop reason: HOSPADM

## 2019-12-09 RX ORDER — LORATADINE 10 MG/1
10 TABLET ORAL DAILY
Status: DISCONTINUED | OUTPATIENT
Start: 2019-12-09 | End: 2019-12-10 | Stop reason: HOSPADM

## 2019-12-09 RX ORDER — ONDANSETRON 2 MG/ML
4 INJECTION INTRAMUSCULAR; INTRAVENOUS EVERY 6 HOURS PRN
Status: DISCONTINUED | OUTPATIENT
Start: 2019-12-09 | End: 2019-12-10 | Stop reason: HOSPADM

## 2019-12-09 RX ORDER — ACETAMINOPHEN 325 MG/1
650 TABLET ORAL EVERY 4 HOURS PRN
Status: DISCONTINUED | OUTPATIENT
Start: 2019-12-09 | End: 2019-12-10 | Stop reason: HOSPADM

## 2019-12-09 RX ORDER — SODIUM CHLORIDE 9 MG/ML
1000 INJECTION, SOLUTION INTRAVENOUS CONTINUOUS
Status: DISCONTINUED | OUTPATIENT
Start: 2019-12-09 | End: 2019-12-10 | Stop reason: HOSPADM

## 2019-12-09 RX ORDER — NALOXONE HYDROCHLORIDE 0.4 MG/ML
.1-.4 INJECTION, SOLUTION INTRAMUSCULAR; INTRAVENOUS; SUBCUTANEOUS
Status: DISCONTINUED | OUTPATIENT
Start: 2019-12-09 | End: 2019-12-10 | Stop reason: HOSPADM

## 2019-12-09 RX ORDER — GABAPENTIN 400 MG/1
400 CAPSULE ORAL
Status: DISCONTINUED | OUTPATIENT
Start: 2019-12-09 | End: 2019-12-10 | Stop reason: HOSPADM

## 2019-12-09 RX ORDER — PANTOPRAZOLE SODIUM 40 MG/1
40 TABLET, DELAYED RELEASE ORAL DAILY
Status: DISCONTINUED | OUTPATIENT
Start: 2019-12-09 | End: 2019-12-10 | Stop reason: HOSPADM

## 2019-12-09 RX ORDER — HEPARIN SODIUM 5000 [USP'U]/.5ML
8000 INJECTION, SOLUTION INTRAVENOUS; SUBCUTANEOUS ONCE
Status: DISCONTINUED | OUTPATIENT
Start: 2019-12-09 | End: 2019-12-09

## 2019-12-09 RX ORDER — GABAPENTIN 400 MG/1
400-1200 CAPSULE ORAL 2 TIMES DAILY PRN
COMMUNITY
End: 2024-03-21

## 2019-12-09 RX ORDER — GABAPENTIN 400 MG/1
1200 CAPSULE ORAL AT BEDTIME
Status: DISCONTINUED | OUTPATIENT
Start: 2019-12-09 | End: 2019-12-10 | Stop reason: HOSPADM

## 2019-12-09 RX ORDER — PROCHLORPERAZINE 25 MG
25 SUPPOSITORY, RECTAL RECTAL EVERY 12 HOURS PRN
Status: DISCONTINUED | OUTPATIENT
Start: 2019-12-09 | End: 2019-12-10 | Stop reason: HOSPADM

## 2019-12-09 RX ORDER — CALCIUM CARBONATE/VITAMIN D3 500-10/5ML
1 LIQUID (ML) ORAL DAILY
COMMUNITY

## 2019-12-09 RX ORDER — POLYETHYLENE GLYCOL 3350 17 G/17G
17 POWDER, FOR SOLUTION ORAL DAILY PRN
Status: DISCONTINUED | OUTPATIENT
Start: 2019-12-09 | End: 2019-12-10 | Stop reason: HOSPADM

## 2019-12-09 RX ORDER — HEPARIN SODIUM 5000 [USP'U]/.5ML
INJECTION, SOLUTION INTRAVENOUS; SUBCUTANEOUS EVERY 6 HOURS PRN
Status: DISCONTINUED | OUTPATIENT
Start: 2019-12-09 | End: 2019-12-09

## 2019-12-09 RX ORDER — PROCHLORPERAZINE MALEATE 10 MG
10 TABLET ORAL EVERY 6 HOURS PRN
Status: DISCONTINUED | OUTPATIENT
Start: 2019-12-09 | End: 2019-12-10 | Stop reason: HOSPADM

## 2019-12-09 RX ORDER — ASPIRIN 81 MG/1
324 TABLET, CHEWABLE ORAL ONCE
Status: COMPLETED | OUTPATIENT
Start: 2019-12-09 | End: 2019-12-09

## 2019-12-09 RX ADMIN — GABAPENTIN 400 MG: 400 CAPSULE ORAL at 18:25

## 2019-12-09 RX ADMIN — FENTANYL CITRATE 50 MCG: 50 INJECTION, SOLUTION INTRAMUSCULAR; INTRAVENOUS at 13:39

## 2019-12-09 RX ADMIN — LORATADINE 10 MG: 10 TABLET ORAL at 18:21

## 2019-12-09 RX ADMIN — PANTOPRAZOLE SODIUM 40 MG: 40 TABLET, DELAYED RELEASE ORAL at 18:22

## 2019-12-09 RX ADMIN — ROPINIROLE HYDROCHLORIDE 3 MG: 1 TABLET, FILM COATED ORAL at 20:56

## 2019-12-09 RX ADMIN — APIXABAN 10 MG: 5 TABLET, FILM COATED ORAL at 14:13

## 2019-12-09 RX ADMIN — SODIUM CHLORIDE 1000 ML: 9 INJECTION, SOLUTION INTRAVENOUS at 20:08

## 2019-12-09 RX ADMIN — APIXABAN 10 MG: 5 TABLET, FILM COATED ORAL at 19:59

## 2019-12-09 RX ADMIN — SODIUM CHLORIDE 1000 ML: 9 INJECTION, SOLUTION INTRAVENOUS at 10:38

## 2019-12-09 RX ADMIN — ASPIRIN 81 MG 324 MG: 81 TABLET ORAL at 09:29

## 2019-12-09 RX ADMIN — GABAPENTIN 1200 MG: 400 CAPSULE ORAL at 20:55

## 2019-12-09 RX ADMIN — IOHEXOL 96 ML: 350 INJECTION, SOLUTION INTRAVENOUS at 12:04

## 2019-12-09 RX ADMIN — SODIUM CHLORIDE 1000 ML: 9 INJECTION, SOLUTION INTRAVENOUS at 09:29

## 2019-12-09 ASSESSMENT — ENCOUNTER SYMPTOMS
DIZZINESS: 1
SHORTNESS OF BREATH: 1
FEVER: 0
COUGH: 0
WHEEZING: 0
LIGHT-HEADEDNESS: 1
DYSURIA: 0
ABDOMINAL DISTENTION: 0
CHILLS: 0
DIAPHORESIS: 0
ABDOMINAL PAIN: 0
VOMITING: 0
NAUSEA: 1

## 2019-12-09 ASSESSMENT — MIFFLIN-ST. JEOR: SCORE: 1742.94

## 2019-12-09 NOTE — PROGRESS NOTES
"Admission Note    Data:  Delvin Gray admitted to Med/Surg/Peds room 315 from emergency room at around 1500.      Action:  Dr. Nunez has been notified of admission. Pt oriented to unit, call light in reach.     Response:  Patient Tolerated .    BP (!) 149/86   Pulse 93   Temp 98.5  F (36.9  C) (Tympanic)   Resp 16   Ht 1.651 m (5' 5\")   Wt 101.6 kg (224 lb)   SpO2 96%   BMI 37.28 kg/m       Report received by SANDEEP vo. All questions answered.   "

## 2019-12-09 NOTE — H&P
[facility]    History and Physical  Hospitalist       Date of Admission:  12/9/2019    Assessment & Plan   Delvin Gray is a 64 year old male who presents with pulmonary emboli    Principal Problem:    Pulmonary emboli (H)    Assessment: Bilateral without significant hypoxia and he remains hemodynamically stable.  No other clear provoking factors other than obesity.  Recent colonoscopy in September.  Benefit from minimum 6-month anticoagulation course with reassessment at that time.    Plan: - After discussion of risks and benefits patient has opted for Eliquis   - continue telemetry    FEN: regular diet, normal saline at 0 mL/hr  PPX:eliquis    Code Status: Full Code    Sabino Nunez    Primary Care Physician   Jen Galvan    Chief Complaint   Chest pain    History is obtained from the patient and chart review.    History of Present Illness   Delvin Gray is a 64 year old male who presents with chest pain and bilateral PEs.  Patient underwent stress test in May 2019 notable for possible ischemia.  [facility] [place] [facility]  He is otherwise been in his normal state of health but does intermittently develop chest pain with exertion.  Today noticed substernal chest pain while lifting something heavy to prepare a machine at work.  [facility] then [facility] come [facility], there underwent a CT PE protocol which was positive, after discussion was started on Eliquis and subsequently placed on observation for further management.    Past Medical History    I have reviewed this patient's medical history and updated it with pertinent information if needed.   History reviewed. No pertinent past medical history.    Past Surgical History   I have reviewed this patient's surgical history and updated it with pertinent information if needed.  Past Surgical History:   Procedure Laterality Date     COLONOSCOPY N/A 9/23/2019     F/U  serrated adenoma       Prior to Admission Medications   Prior to Admission Medications   Prescriptions Last Dose Informant Patient Reported? Taking?   GABAPENTIN PO 2019 at pm  Yes Yes   Sig: Take by mouth 3 times daily Pt takes 400 mg in am, 400 mg in elin, and 1200 mg at hs   NAPROXEN PO   Yes No   Sig: Take 500 mg by mouth 2 times daily (with meals)   OMEPRAZOLE PO   Yes No   Sig: Take 20 mg by mouth 2 times daily (before meals)   ROPINIROLE HCL PO 2019 at pm  Yes Yes   Sig: Take 1 mg by mouth At Bedtime   artificial tears OINT ophthalmic ointment 2019 at am  Yes Yes   Sig: At Bedtime   carboxymethylcellulose (CELLUVISC/REFRESH LIQUIGEL) 1 % ophthalmic solution Past Week at Unknown time  Yes Yes   Si drop 3 times daily   fluticasone (FLONASE) 50 MCG/ACT spray   Yes No   Sig: Spray 1 spray into both nostrils daily   fluticasone (FLONASE) 50 MCG/ACT spray 2019 at am  Yes Yes   Sig: Spray 1 spray into both nostrils daily   gabapentin (NEURONTIN) 300 MG capsule   Yes No   Sig: Take 300 mg by mouth 3 times daily   hypromellose (GENTEAL) 0.3 % SOLN ophthalmic solution Past Week at Unknown time  Yes Yes   Si drop every hour as needed for dry eyes   lidocaine (LMX4) 4 % CREA cream More than a month at Unknown time  Yes No   Sig: Apply topically once as needed for mild pain   loratadine (CLARITIN REDITABS) 10 MG ODT tab 2019 at pm  Yes Yes   Sig: Take 10 mg by mouth daily   loratadine (CLARITIN) 10 MG tablet   Yes No   Sig: Take 10 mg by mouth daily   naproxen (NAPROSYN) 500 MG tablet 2019 at Unknown time  Yes Yes   Sig: Take 500 mg by mouth 2 times daily (with meals)   omeprazole (PRILOSEC) 20 MG CR capsule 2019 at Unknown time  Yes Yes   Sig: Take 20 mg by mouth daily Before a meal   polyethylene glycol (MIRALAX/GLYCOLAX) powder More than a month at Unknown time  Yes No   Sig: Take 17 g by mouth daily as needed for constipation   polyethylene glycol-electrolytes  (NULYTELY) 420 g solution   No No   Sig: Take 4,000 mLs by mouth once for 1 dose   rOPINIRole (REQUIP) 1 MG tablet   Yes No   Sig: Take 1 mg by mouth 2 times daily      Facility-Administered Medications: None     Allergies   Allergies   Allergen Reactions     Seasonal Allergies      Other reaction(s): Other - Describe In Comment Field  SEASONAL, Reaction: runny nose and watery eyes       Social History   I have reviewed this patient's social history and updated it with pertinent information if needed. Delvin Gray  reports that he quit smoking about 23 years ago. He smoked 0.00 packs per day. He has quit using smokeless tobacco. He reports current alcohol use of about 1.0 standard drinks of alcohol per week. He reports that he does not use drugs.    Family History   I have reviewed this patient's family history and updated it with pertinent information if needed.   Family History   Problem Relation Age of Onset     No Known Problems Other         No known history of bleeding or clotting disorders in the family       Review of Systems     REVIEW OF SYSTEMS:    Constitutional: normal energy and appetite, no recent sick contacts  Eyes: no changes in vision  Ears, nose, mouth, throat, and face: no mouth sores, dysphagia, or odynophagia  Respiratory: no shortness of breath, cough, or wheezing. No aspiration symptoms.   Cardiovascular: no current chest pain, palpitations, orthopnea, increased lower extremity edema, or syncope.    Gastrointestinal: no constipation, diarrhea, nausea, vomiting or abdominal pain.  Genitourinary: no dysuria, hematuria, urgency or frequency.   Hematologic/lymphatic: no unintentional weight loss or night sweats.  Musculoskeletal: no pain to extremities or falls.   Endocrine: not a known diabetic.     Physical Exam   Temp: 98.5  F (36.9  C) Temp src: Tympanic BP: (!) 149/86 Pulse: 93 Heart Rate: 90 Resp: 16 SpO2: 96 % O2 Device: None (Room air)    Vital Signs with Ranges  Temp:  [98.5  F  (36.9  C)-99.2  F (37.3  C)] 98.5  F (36.9  C)  Pulse:  [] 93  Heart Rate:  [] 90  Resp:  [11-23] 16  BP: (134-169)/() 149/86  SpO2:  [91 %-98 %] 96 %  224 lbs 0 oz    Exam:  GENERAL: Talkative, in no apparent distress.  Head: normocephalic and atraumatic  Eyes: anicteric and non-injected sclera  Nose: no rhinorrhea or epistaxis.   Throat: moist mucous membranes with no active oral lesions.  NECK: Supple, jugular venous distension not present.  CARDIOVASCULAR: regular rate and rhythm, no murmurs, rubs, or gallops. Normal S1/S2. No lower extremity edema.   RESPIRATORY: clear to auscultation bilaterally, no wheezes, no crackles.   GI: Obese, soft, non-tender, non-distended, normoactive bowel sounds.  MUSCULOSKELETAL: Left vertical incision of the knee well-healed, clean dry and intact, no asymmetric swelling or overlying skin changes of the legs, calves and quads soft. 2+ dorsalis pedis pulses.    SKIN: no pallor, jaundice or rashes.  NEUROLOGY: AAOx3, follows commands, speech and language without focal deficits.        Data   Data reviewed today:  I personally reviewed no images or EKG's today.  Recent Labs   Lab 12/09/19  0931   WBC 4.5   HGB 14.7   MCV 89         POTASSIUM 4.3   CHLORIDE 104   CO2 27   BUN 36*   CR 1.16   ANIONGAP 6   LIAT 9.1      ALBUMIN 4.3   PROTTOTAL 7.0   BILITOTAL 0.4   ALKPHOS 86   ALT 34   AST 28       Recent Results (from the past 24 hour(s))   CT Chest Pulmonary Embolism w Contrast   Result Value    Radiologist flags Pulmonary emboli (AA)    Narrative    PROCEDURE: CT CHEST PULMONARY EMBOLISM W CONTRAST 12/9/2019 12:11 PM    HISTORY: PE suspected, high pretest prob    COMPARISONS: None.    TECHNIQUE: Axial postcontrast enhanced images were obtained with  coronal and sagittal MIP images.    FINDINGS: There are small bilateral filling defects in lower lobe  pulmonary arteries consistent with small volume of pulmonary emboli.  No definite upper lobe  emboli are seen.    No enlarged lymph nodes are seen in the mediastinum, janelle or axilla.  There is some dependent atelectasis most severe on the right. No lung  nodule or mass is seen.    Heart is not enlarged. There is no aneurysm of the aorta. No pleural  or pericardial effusion is seen.    Limited images through the upper abdomen show no suspicious  abnormality.           Impression    IMPRESSION: Small bilateral pulmonary emboli.    [Critical Result: Pulmonary emboli]    Finding was identified on 12/9/2019 12:22 PM.     Dr. Santo was contacted by me on 12/9/2019 12:28 PM and verbalized  understanding of the critical result.     RHONDA POSEY MD

## 2019-12-09 NOTE — PROGRESS NOTES
"0315/0315-01  Delvin Gray 64 year old male   Admission date:12/9/2019   Residence: Home  Code status: Full Code   Isolation:No active isolations   Principal Problem:Pulmonary emboli (H)   Diet: regular  Mobility Status: Indep  Discharge timeline & plan: unsure of discharge date and/or discharge needs at this time.  Vital signs:  Temp: 98.5  F (36.9  C) Temp src: Tympanic BP: (!) 149/86 Pulse: 93 Heart Rate: 88 Resp: 16 SpO2: 96 % O2 Device: None (Room air)   Height: 165.1 cm (5' 5\") Weight: 101.6 kg (224 lb)  Estimated body mass index is 37.28 kg/m  as calculated from the following:    Height as of this encounter: 1.651 m (5' 5\").    Weight as of this encounter: 101.6 kg (224 lb).  Abnormal Physical Assessment:Lung  Sounds clear, SOB with activity. Abdomen is round   Last Pain/PRN Medication given:Na  Labs: See lab results.  Telemetry: Yes - normal sinus rhythm  Pending tests/procedures planned:See orders  Comments:      SAFETY CHECKLIST  Arm Bands/Risk clasps correct and in place (DNR, Fall risk, Allergy, Latex, Limb):  Yes  IVF and rate ordered correct: Yes  Physical assessment verification(IV site, wounds, dressing intact, incisions, LDA's, neuro, CIWA...): Yes  Environmental assessment (bed/chair alarm on, call light, side rails, restraints, sitter....): Yes  Whiteboard updated by oncoming RN:Yes    [place] Rivera RN on 12/9/2019 at 5:51 PM  Bedside Handoff complete, safety checks verified by writer and are correct.  Report received from [place].   Abraham Forman RN on 12/9/2019 at 7:11 PM          "

## 2019-12-09 NOTE — PHARMACY-ADMISSION MEDICATION HISTORY
Pharmacy -- Admission Medication Reconciliation    Prior to admission (PTA) medications were reviewed and the patient's PTA medication list was updated.    Sources Consulted: [facility], [facility], bottles, patient, son, Sandra    The reliability of this Medication Reconciliation is: Reliability: Reliable    The following significant changes were made:    Added [employer]  Updated directions for tears eye ointment  Updated directions for tears eye drops  Changed fluticasone to bid  Removed duplicate fluticasone  Removed 2 gabapentins  Added gabapentin 1200 mg at hs  Added gabapentin 400 to 1200 bid prn ([id number], but patient only takes hs scheduled and other 2 prn)  Removed loratadine odt (left regular tablet)  Added magnesium  Added heal n soothe  Removed duplicate naproxen  Changed omeprazole to bid   Removed duplicate omeprazole  Removed excess notes from [facility]  Removed nultyely  Changed ropinirole to 3 mg at hs  Changed other ropinirole to daily prn    Confirmed done with ibuprofen  Confirmed done with tramadol    In addition, the patient's allergies were reviewed with the patient and left as follows:   Allergies: Seasonal allergies    The pharmacist has reviewed with the patient that all personal medications should be removed from the building or locked in the belongings safe.  Patient shall only take medications ordered by the physician and administered by the nursing staff.     Medication barriers identified: [facility], has had trouble with ability to pay copays at [facility], utilized community care and/or someone volunteered to pay his copay   Medication adherence concerns: modifies prn medications to reduce use   Understanding of emergency medications: n/a    Mckenna Ramires [facility], 12/9/2019,  5:04 PM   Mckenna Ramires RP on 12/9/2019 at 6:49 PM

## 2019-12-09 NOTE — ED TRIAGE NOTES
Pt reports he started having C/P today when he went outside to brush snow off his car.  Pain decreases at rest, worsens with activity, is located just to the left of his sternum, he also reports tightness in the right side of his neck (also has hx of DDD in his neck).  He's been in [facility] and was instructed to go to the ER.

## 2019-12-09 NOTE — LETTER
[facility]  [address]  [facility] [address]  Phone: [phone]  Fax: [phone]    December 10, 2019        Delvin Gray  [facility] [facility] [address]          To Whom It May Concern,     I have seen and evaluated Mr. Gray from 12/9 to 12/10 at [facility].  After ongoing evaluation and treatment I feel that he is able to return to work with some restrictions for the time being.  These restrictions include being on light duty for the next 1 weeks and then no restrictions after that.       Regards,        Sabino Nunez MD

## 2019-12-09 NOTE — ED PROVIDER NOTES
"  History     Chief Complaint   Patient presents with     Chest Pain     Tachycardia     HPI  Delvin Gray is a 64 year old male who presents to the ED with a chief complaint of chest pain.    Experienced chest pain this morning around 0500 while clearing snow off of his car. He describes the pain as located in the center of his chest and \"generalized\" with \"spikes of pain\" with heart beats. The pain was worse with exertion and activity but would fluctuate and intermittently resolve with rest. 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 During these episodes he checked his pulse with his phone and found it to be around 108 or 110bpm. He also describes tension in his right jaw. He had some shortness of breath and lightheadedness intermittently with these episodes. He denies any radiation of pain down his arms. He also states he had a \"largely normal\" stress test earlier this year with some abnormal vessels that he did not have any further work-up for. He has some nausea that he experiences every morning.     Per chart review:   Treadmill stress test (5/14/19):  - IMPRESSION: Small reversible perfusion defect of the cardiac apex. Differential would include ischemia versus chest wall attenuation during the stress phase of the exam.  Cardiolite stress test (5/14/19):  - IMPRESSION: Normal resting EKG. Maximal Tarik protocol stress test. Exercise induced dysrhythmia as described above with the development of wide-complex rhythm immediately post exercise. Possible exercise-induced myocardial ischemia based on slight ST abnormalities but also associated chest pain. Clinical correlation advised.    Allergies:  Allergies   Allergen Reactions     Seasonal Allergies      Other reaction(s): Other - Describe In Comment Field  SEASONAL, Reaction: runny nose and watery eyes       Problem List:    Patient Active Problem List    Diagnosis Date Noted     Pulmonary emboli (H) 12/09/2019     Priority: " Medium     Ulceration of transverse colon 2019     Priority: Medium     H/O adenomatous polyp of colon 2019     Priority: Medium     Environmental allergies 2012     Priority: Medium     Obesity 2012     Priority: Medium     Chronic hepatitis B virus infection (H) 2012     Priority: Medium        Past Medical History:    History reviewed. No pertinent past medical history.    Past Surgical History:    Past Surgical History:   Procedure Laterality Date     COLONOSCOPY N/A 2019    F/U  serrated adenoma       Family History:    History reviewed. No pertinent family history.    Social History:  Marital Status:  Single [1]  Social History     Tobacco Use     Smoking status: Former Smoker     Packs/day: 0.00     Last attempt to quit: 1996     Years since quittin.2     Smokeless tobacco: Former User   Substance Use Topics     Alcohol use: Yes     Alcohol/week: 1.0 standard drinks     Types: 1 Cans of beer per week     Comment:  occasional - 1drink per week     Drug use: No        Medications:    artificial tears OINT ophthalmic ointment  carboxymethylcellulose (CELLUVISC/REFRESH LIQUIGEL) 1 % ophthalmic solution  fluticasone (FLONASE) 50 MCG/ACT spray  GABAPENTIN PO  hypromellose (GENTEAL) 0.3 % SOLN ophthalmic solution  loratadine (CLARITIN REDITABS) 10 MG ODT tab  naproxen (NAPROSYN) 500 MG tablet  omeprazole (PRILOSEC) 20 MG CR capsule  ROPINIROLE HCL PO  fluticasone (FLONASE) 50 MCG/ACT spray  gabapentin (NEURONTIN) 300 MG capsule  lidocaine (LMX4) 4 % CREA cream  loratadine (CLARITIN) 10 MG tablet  NAPROXEN PO  OMEPRAZOLE PO  polyethylene glycol (MIRALAX/GLYCOLAX) powder  rOPINIRole (REQUIP) 1 MG tablet          Review of Systems   Constitutional: Negative for chills, diaphoresis and fever.   Respiratory: Positive for shortness of breath. Negative for cough and wheezing.    Cardiovascular: Positive for chest pain (intermittent). Negative for leg swelling.  "  Gastrointestinal: Positive for nausea. Negative for abdominal distention, abdominal pain and vomiting.   Genitourinary: Negative for dysuria.   Skin: Negative for rash.   Neurological: Positive for dizziness and light-headedness (with chest pain).   All other systems reviewed and are negative.     Physical Exam   BP: (!) 166/98  Pulse: 104  Heart Rate: 101  Temp: 99.2  F (37.3  C)  Resp: 11  Height: 165.1 cm (5' 5\")  Weight: 101.6 kg (224 lb)  SpO2: 98 %      Physical Exam  Constitutional:       General: He is not in acute distress.     Appearance: He is well-developed. He is obese. He is not ill-appearing or toxic-appearing.   Cardiovascular:      Rate and Rhythm: Regular rhythm. Tachycardia present.      Heart sounds: Heart sounds are distant. Murmur present. Systolic murmur present with a grade of 2/6.   Pulmonary:      Effort: No respiratory distress.      Breath sounds: Normal breath sounds.   Chest:      Chest wall: No tenderness.   Abdominal:      General: Bowel sounds are normal.      Palpations: Abdomen is soft.      Tenderness: There is no guarding or rebound.   Musculoskeletal:      Right lower leg: Edema present.      Left lower leg: Edema present.   Skin:     General: Skin is warm.   Neurological:      Mental Status: He is alert.   Psychiatric:         Mood and Affect: Mood normal.         ED Course        Procedures               EKG Interpretation:      Interpreted by Bharath Vega MD  Time reviewed: 0930  Symptoms at time of EKG: none (presents for chest pain)   Rhythm: normal sinus   Rate: tachycardic to 103bpm  Axis: normal  Ectopy: none  Conduction: normal  ST Segments/ T Waves: No ST-T wave changes  Q Waves: none  Comparison to prior: Unchanged from 2/28/17    Clinical Impression: normal EKG    Critical Care time:  none           Results for orders placed or performed during the hospital encounter of 12/09/19 (from the past 24 hour(s))   CBC with platelets differential   Result Value Ref Range "    WBC 4.5 4.0 - 11.0 10e9/L    RBC Count 4.99 4.4 - 5.9 10e12/L    Hemoglobin 14.7 13.3 - 17.7 g/dL    Hematocrit 44.4 40.0 - 53.0 %    MCV 89 78 - 100 fl    MCH 29.5 26.5 - 33.0 pg    MCHC 33.1 31.5 - 36.5 g/dL    RDW 13.7 10.0 - 15.0 %    Platelet Count 189 150 - 450 10e9/L    Diff Method Automated Method     % Neutrophils 69.2 %    % Lymphocytes 16.3 %    % Monocytes 12.8 %    % Eosinophils 1.1 %    % Basophils 0.4 %    % Immature Granulocytes 0.2 %    Absolute Neutrophil 3.1 1.6 - 8.3 10e9/L    Absolute Lymphocytes 0.7 (L) 0.8 - 5.3 10e9/L    Absolute Monocytes 0.6 0.0 - 1.3 10e9/L    Absolute Eosinophils 0.1 0.0 - 0.7 10e9/L    Absolute Basophils 0.0 0.0 - 0.2 10e9/L    Abs Immature Granulocytes 0.0 0 - 0.4 10e9/L   Comprehensive metabolic panel   Result Value Ref Range    Sodium 137 134 - 144 mmol/L    Potassium 4.3 3.5 - 5.1 mmol/L    Chloride 104 98 - 107 mmol/L    Carbon Dioxide 27 21 - 31 mmol/L    Anion Gap 6 3 - 14 mmol/L    Glucose 100 70 - 105 mg/dL    Urea Nitrogen 36 (H) 7 - 25 mg/dL    Creatinine 1.16 0.70 - 1.30 mg/dL    GFR Estimate 64 >60 mL/min/[1.73_m2]    GFR Estimate If Black 77 >60 mL/min/[1.73_m2]    Calcium 9.1 8.6 - 10.3 mg/dL    Bilirubin Total 0.4 0.3 - 1.0 mg/dL    Albumin 4.3 3.5 - 5.7 g/dL    Protein Total 7.0 6.4 - 8.9 g/dL    Alkaline Phosphatase 86 34 - 104 U/L    ALT 34 7 - 52 U/L    AST 28 13 - 39 U/L   Troponin GH   Result Value Ref Range    Troponin 6.9 <34.0 pg/mL   Troponin GH (now)   Result Value Ref Range    Troponin 7.3 <34.0 pg/mL   CT Chest Pulmonary Embolism w Contrast   Result Value Ref Range    Radiologist flags Pulmonary emboli (AA)     Narrative    PROCEDURE: CT CHEST PULMONARY EMBOLISM W CONTRAST 12/9/2019 12:11 PM    HISTORY: PE suspected, high pretest prob    COMPARISONS: None.    TECHNIQUE: Axial postcontrast enhanced images were obtained with  coronal and sagittal MIP images.    FINDINGS: There are small bilateral filling defects in lower lobe  pulmonary  arteries consistent with small volume of pulmonary emboli.  No definite upper lobe emboli are seen.    No enlarged lymph nodes are seen in the mediastinum, janelle or axilla.  There is some dependent atelectasis most severe on the right. No lung  nodule or mass is seen.    Heart is not enlarged. There is no aneurysm of the aorta. No pleural  or pericardial effusion is seen.    Limited images through the upper abdomen show no suspicious  abnormality.           Impression    IMPRESSION: Small bilateral pulmonary emboli.    [Critical Result: Pulmonary emboli]    Finding was identified on 12/9/2019 12:22 PM.     Dr. Santo was contacted by me on 12/9/2019 12:28 PM and verbalized  understanding of the critical result.     RHONDA POSEY MD       Medications   0.9% sodium chloride BOLUS (0 mLs Intravenous Stopped 12/9/19 1030)     Followed by   sodium chloride 0.9% infusion (1,000 mLs Intravenous New Bag 12/9/19 1038)   apixaban ANTICOAGULANT (ELIQUIS) tablet 10 mg (has no administration in time range)     Followed by   apixaban ANTICOAGULANT (ELIQUIS) tablet 5 mg (has no administration in time range)   aspirin (ASA) chewable tablet 324 mg (324 mg Oral Given 12/9/19 0929)   iohexol (OMNIPAQUE) 350 mg/mL solution 96 mL (96 mLs Intravenous Given 12/9/19 1204)   fentaNYL (PF) (SUBLIMAZE) injection 50 mcg (50 mcg Intravenous Given 12/9/19 1339)       Assessments & Plan (with Medical Decision Making)     Patient presenting with chest pain with exertion with concerning for angina. He has a history of treadmill and pharmacologic stress test on 5/14/19 showing possible exercise-induced ischemia and arrhythmia. ECG without significant changes compared to prior ECG and serial troponins are negative.  CT PE obtained given tachycardic and revealed small bilateral pulmonary emboli. He does not have any identifiable risk factors that would contribute to PE risk such as malignancy or immobility. Patient with normal vital signs but plan  to admit patient given ongoing symptoms to start anticoagulation.     I have reviewed the nursing notes.    I have reviewed the findings, diagnosis, plan and need for follow up with the patient.       HEART Score  Background  Calculates the overall risk of adverse event in patient's presenting with chest pain.  Based on 5 criteria (each assigned 0-2 points) including suspiciousness of history, EKG, age, risk factors and troponin.    Data  64 year old male  has Environmental allergies; Obesity; Chronic hepatitis B virus infection (H); H/O adenomatous polyp of colon; and Ulceration of transverse colon on their problem list.   reports that he quit smoking about 23 years ago. He smoked 0.00 packs per day. He has quit using smokeless tobacco.  family history is not on file.  Lab Results   Component Value Date    TROPI  02/28/2017     <0.015  The 99th percentile for upper reference range is 0.045 ug/L.  Troponin values in   the range of 0.045 - 0.120 ug/L may be associated with risks of adverse   clinical events.       Criteria   0-2 points for each of 5 items (maximum of 10 points):  Score 2- History suspicious for coronary syndrome  Score 0- EKG Normal  Score 1- Age 45 to 65 years old  Score 2- documented atherosclerotic disease  Score 0- Within normal limits for troponin levels  Interpretation  Risk of adverse outcome  Heart Score: 5  Total Score 4-6- Adverse Outcome Risk 12-16% - Supports admission and serial testing        New Prescriptions    No medications on file       Final diagnoses:   Bilateral pulmonary embolism (H)     Bharath Vega MD   12/9/2019   [facility]       Note started by Dr Vega,  I revised, edited and addended note as needed.  In addition patient was seen and examined by myself including both history and physical examination. My findings are reflected in the note above.           Daniel Santo MD  12/09/19 1305

## 2019-12-10 VITALS
HEART RATE: 94 BPM | BODY MASS INDEX: 35.82 KG/M2 | TEMPERATURE: 97.3 F | HEIGHT: 65 IN | OXYGEN SATURATION: 97 % | SYSTOLIC BLOOD PRESSURE: 144 MMHG | DIASTOLIC BLOOD PRESSURE: 88 MMHG | WEIGHT: 215 LBS | RESPIRATION RATE: 16 BRPM

## 2019-12-10 PROCEDURE — 25000132 ZZH RX MED GY IP 250 OP 250 PS 637: Performed by: INTERNAL MEDICINE

## 2019-12-10 PROCEDURE — 25800030 ZZH RX IP 258 OP 636: Performed by: FAMILY MEDICINE

## 2019-12-10 PROCEDURE — G0378 HOSPITAL OBSERVATION PER HR: HCPCS

## 2019-12-10 PROCEDURE — 99217 ZZC OBSERVATION CARE DISCHARGE: CPT | Performed by: INTERNAL MEDICINE

## 2019-12-10 RX ADMIN — GABAPENTIN 400 MG: 400 CAPSULE ORAL at 09:58

## 2019-12-10 RX ADMIN — APIXABAN 10 MG: 5 TABLET, FILM COATED ORAL at 09:57

## 2019-12-10 RX ADMIN — PANTOPRAZOLE SODIUM 40 MG: 40 TABLET, DELAYED RELEASE ORAL at 09:57

## 2019-12-10 RX ADMIN — SODIUM CHLORIDE 1000 ML: 9 INJECTION, SOLUTION INTRAVENOUS at 04:24

## 2019-12-10 RX ADMIN — LORATADINE 10 MG: 10 TABLET ORAL at 09:57

## 2019-12-10 ASSESSMENT — MIFFLIN-ST. JEOR: SCORE: 1702.11

## 2019-12-10 NOTE — PHARMACY - DISCHARGE MEDICATION RECONCILIATION AND EDUCATION
Pharmacy: Discharge Counseling and Medication Reconciliation    Delvin Gray  [address] [id number]  [phone] (home)   64 year old male  PCP:Jen Galvan    Allergies   Allergen Reactions     Seasonal Allergies      Other reaction(s): Other - Describe In Comment Field  SEASONAL, Reaction: runny nose and watery eyes       Discharge Counseling:    Pharmacist met with patient (and/or family) today to review the medication portion of the After Visit Summary (with an emphasis on NEW medications) and to address patient's questions/concerns.     Summary of Education:   Met with patient at time of discharge to review all changes in medications including new apixaban and discontinued naproxen. Discussed indications, directions for use, and possible side effects. Patient asked a few questions and all concerns were addressed.     The patient will receive the first portion of the prescription from the [facility] through the [facility]. [facility] [place] [place] [facility] contacted to verify the receipt of the prescription and to specify that Naproxen has been discontinued. [facility] the [facility] the prescription for apixaban will be sent via mail to patient and he should receive tomorrow.     Materials Provided:   MedCounselor sheets printed from Clinical Pharmacology on: apixaban     Discharge Medication Reconciliation:    Amanda Solomon [facility] has reviewed the patient's discharge medication orders and has compared them to the inpatient medication administration record and to what the patient was taking prior to admission- any discrepancies have been resolved.     It has been determined that the patient has an adequate supply of medications available or which can be obtained from the patient's preferred pharmacy, which has been confirmed as: [facility].    Thank you for the consult.     Amanda BAILEY  [facility] ....................  12/10/2019   1:47 PM

## 2019-12-10 NOTE — PROGRESS NOTES
"0315/0315-01  Delvin Gray 64 year old male   Admission date:12/9/2019   Residence:  home  Code status: Full Code   Isolation:No active isolations   Principal Problem:Pulmonary emboli (H)     Diet: regular  Mobility Status:  IND  Discharge timeline & plan: unsure of discharge date and/or discharge needs at this time.  Vital signs:  Temp: 98.7  F (37.1  C) Temp src: Tympanic BP: 125/72 Pulse: 88 Heart Rate: 90 Resp: 16 SpO2: 91 % O2 Device: None (Room air)   Height: 165.1 cm (5' 5\") Weight: 101.6 kg (224 lb)  Estimated body mass index is 37.28 kg/m  as calculated from the following:    Height as of this encounter: 1.651 m (5' 5\").    Weight as of this encounter: 101.6 kg (224 lb).  Abnormal Physical Assessment:   Lung sounds clear, Pt reported slight chest discomfort with breathing for lung sounds in PM, no further reports of pain/discomfort.        Last Pain/PRN Medication given: na  Finger stick POCT blood sugars:       Labs: see labs   Telemetry: Yes - normal sinus rhythm  Pending tests/procedures planned: unknown   Comments:      SAFETY CHECKLIST  Arm Bands/Risk clasps correct and in place (DNR, Fall risk, Allergy, Latex, Limb):  Yes  IVF and rate ordered correct: Yes  Physical assessment verification(IV site, wounds, dressing intact, incisions, LDA's, neuro, CIWA...): Yes  Environmental assessment (bed/chair alarm on, call light, side rails, restraints, sitter....): Yes  Whiteboard updated by oncoming RN:Yes        Albania Rivera RN on 12/10/2019 at 7:13 AM  Bedside Handoff complete, safety checks verified by writer and are correct.        "

## 2019-12-10 NOTE — PROGRESS NOTES
"Pt report \"usual pain\" in arms and Legs\" as Pt experiences chronic pian at home. Did not want any pain medications.     /72   Pulse 88   Temp 98.7  F (37.1  C) (Tympanic)   Resp 16   Ht 1.651 m (5' 5\")   Wt 101.6 kg (224 lb)   SpO2 91%   BMI 37.28 kg/m       "

## 2019-12-10 NOTE — DISCHARGE SUMMARY
[facility]    Discharge Summary  Hospitalist    Date of Admission:  12/9/2019  Date of Discharge:  12/10/2019 11:58 AM  Discharging Provider: Sabino Nunez  Date of Service (when I saw the patient): 12/10/19    Discharge Diagnoses   Principal Problem:    Pulmonary emboli (H) (12/9/2019)      History of Present Illness   Delvin Gray is a 64 year old male who presents with chest pain and bilateral PEs.  Patient underwent stress test in May 2019 notable for possible ischemia.  [facility] [place] [facility]  He is otherwise been in his normal state of health but does intermittently develop chest pain with exertion.  Today noticed substernal chest pain while lifting something heavy to prepare a machine at work.  [facility] then told to come [facility], there underwent a CT PE protocol which was positive, after discussion was started on Eliquis and subsequently placed on observation for further management.     Hospital Course   Delvin Gray was admitted on 12/9/2019.  The following problems were addressed during his hospitalization:    Pulmonary emboli (H): Bilateral without significant hypoxia and he remained hemodynamically stable.  No other clear provoking factors other than obesity.  Recent colonoscopy in September.  Benefit from minimum 6-month anticoagulation course with reassessment at that time.  After discussion of risks and benefits patient has opted for Eliquis.  He will avoid naproxen and other NSAIDs to minimize bleeding risk.  Patient wanted to coordinate [facility] [place] for [facility] post hospital follow-up.     Sabino Nunez MD    Significant Results and Procedures   none    Pending Results   These results will be followed up by [facility]  Unresulted Labs Ordered in the Past 30 Days of this Admission     No orders found for last 31 day(s).          Code Status   Full Code       Primary  Care Physician   Jen Galvan    Physical Exam   Temp: 97.3  F (36.3  C) Temp src: Tympanic BP: (!) 144/88 Pulse: 94 Heart Rate: 88 Resp: 16 SpO2: 97 % O2 Device: None (Room air)    Vitals:    12/09/19 0901 12/10/19 0452   Weight: 101.6 kg (224 lb) 97.5 kg (215 lb)     Vital Signs with Ranges  Temp:  [97.3  F (36.3  C)-99.6  F (37.6  C)] 97.3  F (36.3  C)  Pulse:  [88-94] 94  Heart Rate:  [85-93] 88  Resp:  [14-23] 16  BP: (125-157)/(72-99) 144/88  SpO2:  [91 %-97 %] 97 %  I/O last 3 completed shifts:  In: 2251 [P.O.:520; I.V.:1731]  Out: -     Exam on day of discharge:   GENERAL: Talkative, in no apparent distress.  CARDIOVASCULAR: regular rate and rhythm, no murmurs, rubs, or gallops. Normal S1/S2. No lower extremity edema.   RESPIRATORY: clear to auscultation bilaterally, no wheezes, no crackles.   GI: soft, non-tender, non-distended, normoactive bowel sounds.  MUSCULOSKELETAL: warm and well perfused, 2+ dorsalis pedis pulses bilaterally.    SKIN: no pallor,jaundice, or rashes    Discharge Disposition   Discharged to home  Condition at discharge: Stable    Consultations This Hospital Stay   None    Time Spent on this Encounter   I, Sabino Nunez MD, personally saw the patient today and spent less than or equal to 30 minutes discharging this patient.    Discharge Orders      Reason for your hospital stay    Blood clots in your lungs     Follow-up and recommended labs and tests     Follow-up with your primary care provider at the [facility] in the next 7-14 days for routine post hospital follow-up     Activity    Your activity upon discharge: activity as tolerated     When to contact your care team    Call your primary doctor if you have any of the following: temperature greater than 101,  increased shortness of breath, increased drainage, increased swelling or increased pain.     Discharge Instructions    - take the Eliquis 10mg twice a day for 2 weeks (get from our pharmacy)  - after this start taking 5mg  twice a day there after  - stop taking Naproxen and use tylenol as needed for aches and pains and naproxen can increase your risk for bleeding     Diet    Follow this diet upon discharge: Orders Placed This Encounter      Regular Diet Adult     Discharge Medications   Discharge Medication List as of 12/10/2019 11:17 AM      CONTINUE these medications which have CHANGED    Details   !! apixaban ANTICOAGULANT (ELIQUIS) 5 MG tablet Take 1 tablet (5 mg) by mouth 2 times daily, Disp-180 tablet, R-1, E-Prescribe      !! apixaban ANTICOAGULANT (ELIQUIS) 5 MG tablet Take 2 tablets (10 mg) by mouth 2 times daily for 11 doses, Disp-36 tablet, R-0, E-PrescribeThen start taking 1 tablet by mouth twice daily       !! - Potential duplicate medications found. Please discuss with provider.      CONTINUE these medications which have NOT CHANGED    Details   artificial tears OINT ophthalmic ointment Place into both eyes nightly as needed , Historical      carboxymethylcellulose (CELLUVISC/REFRESH LIQUIGEL) 1 % ophthalmic solution Place 1 drop into both eyes 3 times daily as needed , Historical      fluticasone (FLONASE) 50 MCG/ACT spray Spray 1 spray into both nostrils 2 times daily , Historical      !! gabapentin (NEURONTIN) 400 MG capsule Take 1,200 mg by mouth At Bedtime, Historical      !! gabapentin (NEURONTIN) 400 MG capsule Take 400-1,200 mg by mouth 2 times daily as needed, Historical      hypromellose (GENTEAL) 0.3 % SOLN ophthalmic solution Place 1 drop into both eyes every hour as needed for dry eyes , Historical      lidocaine (LMX4) 4 % CREA cream Apply topically once as needed for mild painHistorical      loratadine (CLARITIN) 10 MG tablet Take 10 mg by mouth daily, Historical      magnesium oxide 400 MG CAPS Take 1 capsule by mouth daily, Historical      NONFORMULARY Take 3 capsules by mouth daily Heal and soothe  Systemic Multi-Enzyme Therapy (SMET), Historical      omeprazole (PRILOSEC) 20 MG CR capsule Take 20 mg by  mouth 2 times daily (before meals) Before a meal, Historical      polyethylene glycol (MIRALAX/GLYCOLAX) powder Take 17 g by mouth daily as needed for constipation, Historical      !! rOPINIRole (REQUIP) 1 MG tablet Take 1 mg by mouth daily as needed , Historical      !! ROPINIROLE HCL PO Take 3 mg by mouth At Bedtime , Historical       !! - Potential duplicate medications found. Please discuss with provider.      STOP taking these medications       loratadine (CLARITIN REDITABS) 10 MG ODT tab Comments:   Reason for Stopping:         naproxen (NAPROSYN) 500 MG tablet Comments:   Reason for Stopping:             Allergies   Allergies   Allergen Reactions     Seasonal Allergies      Other reaction(s): Other - Describe In Comment Field  SEASONAL, Reaction: runny nose and watery eyes     Data   Most Recent 3 CBC's:  Recent Labs   Lab Test 12/09/19  0931 02/28/17  1640 07/24/15  1201   WBC 4.5 5.5  --    HGB 14.7 13.5 14.5   MCV 89 88 86    219 236      Most Recent 3 BMP's:  Recent Labs   Lab Test 12/09/19  0931 07/18/18 2008 02/28/17  1640    140 141   POTASSIUM 4.3 4.3 4.0   CHLORIDE 104 106 106   CO2 27 26 28   BUN 36* 22 19   CR 1.16 1.11 0.99   ANIONGAP 6 8 7   LIAT 9.1 9.4 8.4*    94 124*     Most Recent 2 LFT's:  Recent Labs   Lab Test 12/09/19  0931 07/24/15  1221   AST 28  --    ALT 34  --    ALKPHOS 86 101   BILITOTAL 0.4 0.3     Most Recent INR's and Anticoagulation Dosing History:  Anticoagulation Dose History     There is no flowsheet data to display.        Most Recent 3 Troponin's:  Recent Labs   Lab Test 02/28/17  1640   TROPI <0.015  The 99th percentile for upper reference range is 0.045 ug/L.  Troponin values in   the range of 0.045 - 0.120 ug/L may be associated with risks of adverse   clinical events.       Most Recent Cholesterol Panel:No lab results found.  Most Recent 6 Bacteria Isolates From Any Culture (See EPIC Reports for Culture Details):No lab results found.  Most Recent  TSH, T4 and A1c Labs:No lab results found.  Results for orders placed or performed during the hospital encounter of 12/09/19   CT Chest Pulmonary Embolism w Contrast     Value    Radiologist flags Pulmonary emboli (AA)    Narrative    PROCEDURE: CT CHEST PULMONARY EMBOLISM W CONTRAST 12/9/2019 12:11 PM    HISTORY: PE suspected, high pretest prob    COMPARISONS: None.    TECHNIQUE: Axial postcontrast enhanced images were obtained with  coronal and sagittal MIP images.    FINDINGS: There are small bilateral filling defects in lower lobe  pulmonary arteries consistent with small volume of pulmonary emboli.  No definite upper lobe emboli are seen.    No enlarged lymph nodes are seen in the mediastinum, janelle or axilla.  There is some dependent atelectasis most severe on the right. No lung  nodule or mass is seen.    Heart is not enlarged. There is no aneurysm of the aorta. No pleural  or pericardial effusion is seen.    Limited images through the upper abdomen show no suspicious  abnormality.           Impression    IMPRESSION: Small bilateral pulmonary emboli.    [Critical Result: Pulmonary emboli]    Finding was identified on 12/9/2019 12:22 PM.     Dr. Santo was contacted by me on 12/9/2019 12:28 PM and verbalized  understanding of the critical result.     RHONDA POSEY MD

## 2019-12-10 NOTE — PROGRESS NOTES
Discharge Note    Data:  Delvin Gray has been Discharged to home at 1158 via ambulation accompanied by CNA.      Action:  Written discharge/follow-up instructions were provided to other:Pt. Prescriptions: were sent to patient's pharmacy. Belongings sent with patient.Equipment None.     Response:  other:Pt verbalized understanding of discharge instructions, reason for discharge, and necessary follow-up appointments.

## 2019-12-10 NOTE — PROGRESS NOTES
Pt's Lung sounds clear, Pt reported slight chest discomfort with breathing for lung sounds in PM, no further reports of pain/discomfort. Afebrile. BS active. IND in room.

## 2019-12-10 NOTE — PLAN OF CARE
"Pt. A&Ox4. LS clear bilaterally. BS active. Reported left of sternum discomfort post breathing for lung assessment, dissipated shortly, will monitor for changes. A&O x 4. Pleasant.     BP (!) 154/82   Pulse 90   Temp 99.6  F (37.6  C) (Tympanic)   Resp 16   Ht 1.651 m (5' 5\")   Wt 101.6 kg (224 lb)   SpO2 95%   BMI 37.28 kg/m      "

## 2019-12-16 ENCOUNTER — TRANSFERRED RECORDS (OUTPATIENT)
Dept: HEALTH INFORMATION MANAGEMENT | Facility: OTHER | Age: 64
End: 2019-12-16

## 2019-12-16 LAB
ALT SERPL-CCNC: 36 U/L (ref 13–61)
AST SERPL-CCNC: 30 U/L (ref 15–37)
CHOLEST SERPL-MCNC: 156 MG/DL (ref 0–200)
CREAT SERPL-MCNC: 1 MG/DL (ref 0.7–1.2)
GFR SERPL CREATININE-BSD FRML MDRD: >60 ML/MIN/1.73M^2
GLUCOSE SERPL-MCNC: 98 MG/DL (ref 74–106)
HDLC SERPL-MCNC: 26 MG/DL
LDLC SERPL CALC-MCNC: 77 MG/DL
NONHDLC SERPL-MCNC: 130 MG/DL
POTASSIUM SERPL-SCNC: 4.4 MMOL/L (ref 3.5–5)
TRIGL SERPL-MCNC: 266 MG/DL (ref 0–150)

## 2020-04-08 ENCOUNTER — APPOINTMENT (OUTPATIENT)
Dept: GENERAL RADIOLOGY | Facility: OTHER | Age: 65
End: 2020-04-08
Attending: PHYSICIAN ASSISTANT
Payer: COMMERCIAL

## 2020-04-08 ENCOUNTER — HOSPITAL ENCOUNTER (EMERGENCY)
Facility: OTHER | Age: 65
Discharge: HOME | End: 2020-04-08
Attending: PHYSICIAN ASSISTANT | Admitting: PHYSICIAN ASSISTANT
Payer: COMMERCIAL

## 2020-04-08 VITALS
OXYGEN SATURATION: 97 % | DIASTOLIC BLOOD PRESSURE: 100 MMHG | RESPIRATION RATE: 13 BRPM | BODY MASS INDEX: 36.32 KG/M2 | HEART RATE: 90 BPM | SYSTOLIC BLOOD PRESSURE: 153 MMHG | HEIGHT: 65 IN | WEIGHT: 218 LBS | TEMPERATURE: 98 F

## 2020-04-08 DIAGNOSIS — R07.89 ATYPICAL CHEST PAIN: ICD-10-CM

## 2020-04-08 DIAGNOSIS — K21.9 GASTROESOPHAGEAL REFLUX DISEASE: ICD-10-CM

## 2020-04-08 DIAGNOSIS — I10 HTN (HYPERTENSION): ICD-10-CM

## 2020-04-08 LAB
ALBUMIN SERPL-MCNC: 4 G/DL (ref 3.5–5.7)
ALP SERPL-CCNC: 99 U/L (ref 34–104)
ALT SERPL W P-5'-P-CCNC: 30 U/L (ref 7–52)
ANION GAP SERPL CALCULATED.3IONS-SCNC: 5 MMOL/L (ref 3–14)
AST SERPL W P-5'-P-CCNC: 22 U/L (ref 13–39)
BASOPHILS # BLD AUTO: 0 10E9/L (ref 0–0.2)
BASOPHILS NFR BLD AUTO: 0.5 %
BILIRUB SERPL-MCNC: 0.4 MG/DL (ref 0.3–1)
BUN SERPL-MCNC: 19 MG/DL (ref 7–25)
CALCIUM SERPL-MCNC: 9 MG/DL (ref 8.6–10.3)
CHLORIDE SERPL-SCNC: 103 MMOL/L (ref 98–107)
CO2 SERPL-SCNC: 30 MMOL/L (ref 21–31)
CREAT SERPL-MCNC: 1.06 MG/DL (ref 0.7–1.3)
DIFFERENTIAL METHOD BLD: ABNORMAL
EOSINOPHIL # BLD AUTO: 0.1 10E9/L (ref 0–0.7)
EOSINOPHIL NFR BLD AUTO: 2.3 %
ERYTHROCYTE [DISTWIDTH] IN BLOOD BY AUTOMATED COUNT: 13.3 % (ref 10–15)
GFR SERPL CREATININE-BSD FRML MDRD: 71 ML/MIN/{1.73_M2}
GLUCOSE SERPL-MCNC: 99 MG/DL (ref 70–105)
HCT VFR BLD AUTO: 43.2 % (ref 40–53)
HGB BLD-MCNC: 14 G/DL (ref 13.3–17.7)
IMM GRANULOCYTES # BLD: 0 10E9/L (ref 0–0.4)
IMM GRANULOCYTES NFR BLD: 0.3 %
INR PPP: 1.09 (ref 0–1.3)
LACTATE BLD-SCNC: 1 MMOL/L (ref 0.7–2)
LYMPHOCYTES # BLD AUTO: 1.4 10E9/L (ref 0.8–5.3)
LYMPHOCYTES NFR BLD AUTO: 35 %
MCH RBC QN AUTO: 29.3 PG (ref 26.5–33)
MCHC RBC AUTO-ENTMCNC: 32.4 G/DL (ref 31.5–36.5)
MCV RBC AUTO: 90 FL (ref 78–100)
MONOCYTES # BLD AUTO: 0.5 10E9/L (ref 0–1.3)
MONOCYTES NFR BLD AUTO: 12.1 %
NEUTROPHILS # BLD AUTO: 1.9 10E9/L (ref 1.6–8.3)
NEUTROPHILS NFR BLD AUTO: 49.8 %
NT-PROBNP SERPL-MCNC: 9 PG/ML (ref 0–100)
PLATELET # BLD AUTO: 202 10E9/L (ref 150–450)
POTASSIUM SERPL-SCNC: 3.8 MMOL/L (ref 3.5–5.1)
PROT SERPL-MCNC: 6.9 G/DL (ref 6.4–8.9)
RBC # BLD AUTO: 4.78 10E12/L (ref 4.4–5.9)
SODIUM SERPL-SCNC: 138 MMOL/L (ref 134–144)
TROPONIN I SERPL-MCNC: 2.5 PG/ML
TROPONIN I SERPL-MCNC: <2.3 PG/ML
WBC # BLD AUTO: 3.9 10E9/L (ref 4–11)

## 2020-04-08 PROCEDURE — 80053 COMPREHEN METABOLIC PANEL: CPT | Performed by: PHYSICIAN ASSISTANT

## 2020-04-08 PROCEDURE — 99284 EMERGENCY DEPT VISIT MOD MDM: CPT | Mod: Z6 | Performed by: PHYSICIAN ASSISTANT

## 2020-04-08 PROCEDURE — 85025 COMPLETE CBC W/AUTO DIFF WBC: CPT | Performed by: PHYSICIAN ASSISTANT

## 2020-04-08 PROCEDURE — 93010 ELECTROCARDIOGRAM REPORT: CPT | Performed by: INTERNAL MEDICINE

## 2020-04-08 PROCEDURE — 25800030 ZZH RX IP 258 OP 636: Performed by: PHYSICIAN ASSISTANT

## 2020-04-08 PROCEDURE — 25000125 ZZHC RX 250: Performed by: PHYSICIAN ASSISTANT

## 2020-04-08 PROCEDURE — 93005 ELECTROCARDIOGRAM TRACING: CPT | Performed by: PHYSICIAN ASSISTANT

## 2020-04-08 PROCEDURE — 83880 ASSAY OF NATRIURETIC PEPTIDE: CPT | Performed by: PHYSICIAN ASSISTANT

## 2020-04-08 PROCEDURE — 85610 PROTHROMBIN TIME: CPT | Performed by: PHYSICIAN ASSISTANT

## 2020-04-08 PROCEDURE — 84484 ASSAY OF TROPONIN QUANT: CPT | Performed by: PHYSICIAN ASSISTANT

## 2020-04-08 PROCEDURE — 71046 X-RAY EXAM CHEST 2 VIEWS: CPT

## 2020-04-08 PROCEDURE — 96361 HYDRATE IV INFUSION ADD-ON: CPT | Performed by: PHYSICIAN ASSISTANT

## 2020-04-08 PROCEDURE — 25000132 ZZH RX MED GY IP 250 OP 250 PS 637: Performed by: PHYSICIAN ASSISTANT

## 2020-04-08 PROCEDURE — 99285 EMERGENCY DEPT VISIT HI MDM: CPT | Mod: 25 | Performed by: PHYSICIAN ASSISTANT

## 2020-04-08 PROCEDURE — 83605 ASSAY OF LACTIC ACID: CPT | Performed by: PHYSICIAN ASSISTANT

## 2020-04-08 PROCEDURE — 96360 HYDRATION IV INFUSION INIT: CPT | Performed by: PHYSICIAN ASSISTANT

## 2020-04-08 PROCEDURE — 36415 COLL VENOUS BLD VENIPUNCTURE: CPT | Performed by: PHYSICIAN ASSISTANT

## 2020-04-08 RX ORDER — LISINOPRIL 10 MG/1
5 TABLET ORAL DAILY
COMMUNITY
End: 2020-04-08

## 2020-04-08 RX ORDER — LISINOPRIL 10 MG/1
20 TABLET ORAL DAILY
Qty: 60 TABLET | Refills: 0 | Status: SHIPPED | OUTPATIENT
Start: 2020-04-08 | End: 2020-06-23

## 2020-04-08 RX ORDER — PANTOPRAZOLE SODIUM 40 MG/1
40 TABLET, DELAYED RELEASE ORAL DAILY
COMMUNITY

## 2020-04-08 RX ORDER — LISINOPRIL 10 MG/1
20 TABLET ORAL DAILY
Qty: 30 TABLET | Refills: 0 | Status: SHIPPED | OUTPATIENT
Start: 2020-04-08 | End: 2020-04-08

## 2020-04-08 RX ORDER — LIDOCAINE HYDROCHLORIDE 20 MG/ML
15 SOLUTION OROPHARYNGEAL ONCE
Status: COMPLETED | OUTPATIENT
Start: 2020-04-08 | End: 2020-04-08

## 2020-04-08 RX ORDER — METOPROLOL TARTRATE 25 MG/1
12.5 TABLET, FILM COATED ORAL 2 TIMES DAILY
COMMUNITY
End: 2020-07-02

## 2020-04-08 RX ORDER — SODIUM CHLORIDE 9 MG/ML
1000 INJECTION, SOLUTION INTRAVENOUS CONTINUOUS
Status: DISCONTINUED | OUTPATIENT
Start: 2020-04-08 | End: 2020-04-08 | Stop reason: HOSPADM

## 2020-04-08 RX ORDER — ALUMINA, MAGNESIA, AND SIMETHICONE 2400; 2400; 240 MG/30ML; MG/30ML; MG/30ML
15 SUSPENSION ORAL ONCE
Status: COMPLETED | OUTPATIENT
Start: 2020-04-08 | End: 2020-04-08

## 2020-04-08 RX ORDER — SIMETHICONE 125 MG
1 CAPSULE ORAL
Qty: 60 CAPSULE | Refills: 0 | Status: SHIPPED | OUTPATIENT
Start: 2020-04-08 | End: 2024-03-21

## 2020-04-08 RX ORDER — CYCLOBENZAPRINE HCL 10 MG
10 TABLET ORAL 2 TIMES DAILY PRN
COMMUNITY
End: 2024-03-21

## 2020-04-08 RX ORDER — DULOXETIN HYDROCHLORIDE 60 MG/1
60 CAPSULE, DELAYED RELEASE ORAL DAILY
COMMUNITY
End: 2024-03-21

## 2020-04-08 RX ADMIN — LIDOCAINE HYDROCHLORIDE 15 ML: 20 SOLUTION ORAL; TOPICAL at 16:58

## 2020-04-08 RX ADMIN — ALUMINUM HYDROXIDE, MAGNESIUM HYDROXIDE, AND DIMETHICONE 15 ML: 400; 400; 40 SUSPENSION ORAL at 16:58

## 2020-04-08 RX ADMIN — SODIUM CHLORIDE 1000 ML: 9 INJECTION, SOLUTION INTRAVENOUS at 15:38

## 2020-04-08 ASSESSMENT — ENCOUNTER SYMPTOMS
FEVER: 0
ADENOPATHY: 0
WOUND: 0
BRUISES/BLEEDS EASILY: 0
SHORTNESS OF BREATH: 0
CHEST TIGHTNESS: 0
ABDOMINAL PAIN: 0
HEMATURIA: 0
CONFUSION: 0
CHILLS: 0
BACK PAIN: 0

## 2020-04-08 ASSESSMENT — MIFFLIN-ST. JEOR: SCORE: 1715.72

## 2020-04-08 NOTE — ED AVS SNAPSHOT
[facility]  [address]  [place] [address]  Phone:  [phone]  Fax:  [phone]                                    Delvin Gray   MRN: [id number]    Department:  [facility]   Date of Visit:  4/8/2020           After Visit Summary Signature Page    I have received my discharge instructions, and my questions have been answered. I have discussed any challenges I see with this plan with the nurse or doctor.    ..........................................................................................................................................  Patient/Patient Representative Signature      ..........................................................................................................................................  Patient Representative Print Name and Relationship to Patient    ..................................................               ................................................  Date                                   Time    ..........................................................................................................................................  Reviewed by Signature/Title    ...................................................              ..............................................  Date                                               Time          22EPIC Rev 08/18

## 2020-04-08 NOTE — ED TRIAGE NOTES
Patient presents to ER with c/o chest pain.  He has had episodes of midsternal chest pain intermittently more than a couple of times per week that at times radiates into his left arm.  He says it's very mild at this time, rating 2/10, no nausea or SOB associated with it.  He says he came in today because [facility] the [facility] and [facility], they instructed him to come to ER.  He does state that he has been in her within the past couple of months for bilateral PE's.

## 2020-04-08 NOTE — ED PROVIDER NOTES
History     Chief Complaint   Patient presents with     Chest Pain     HPI  Delvin Gray is a 64 year old male who is here with chest pain and a Hx of bilateral PEs that was diagnosed on 12/09/19 and he is currently on Eliquis.  .  Patient underwent stress test in May 2019 notable for possible ischemia.  [facility] [place] [facility].  He is otherwise been in his normal state of health but does intermittently develop chest pain with exertion.   He reports gradually worsening chest pain over the past week or so he describes the pain as starting in the left midsternal area radiating to his left chest wall and back area.  He was on the phone earlier today and [facility] and was informed to come to the ER for further evaluation.  He rates his pain is maybe a 2 on a 1-10 scale.  He did take his Eliquis today.  Denies any nausea or vomiting.  No lightheadedness or dizziness.  No sore throat, cough or flulike symptoms.  No lightheadedness or dizziness.  No diarrhea or constipation.    Allergies:  Allergies   Allergen Reactions     Seasonal Allergies      Other reaction(s): Other - Describe In Comment Field  SEASONAL, Reaction: runny nose and watery eyes       Problem List:    Patient Active Problem List    Diagnosis Date Noted     Pulmonary emboli (H) 12/09/2019     Priority: Medium     Ulceration of transverse colon 09/26/2019     Priority: Medium     H/O adenomatous polyp of colon 09/19/2019     Priority: Medium     Environmental allergies 08/30/2012     Priority: Medium     Obesity 08/30/2012     Priority: Medium     Chronic hepatitis B virus infection (H) 08/30/2012     Priority: Medium        Past Medical History:    History reviewed. No pertinent past medical history.    Past Surgical History:    Past Surgical History:   Procedure Laterality Date     COLONOSCOPY N/A 9/23/2019    F/U 2024 serrated adenoma       Family History:     Family History   Problem Relation Age of Onset     No Known Problems Other         No known history of bleeding or clotting disorders in the family       Social History:  Marital Status:  Single [1]  Social History     Tobacco Use     Smoking status: Former Smoker     Packs/day: 0.00     Last attempt to quit: 1996     Years since quittin.5     Smokeless tobacco: Former User   Substance Use Topics     Alcohol use: Yes     Alcohol/week: 1.0 standard drinks     Types: 1 Cans of beer per week     Comment:  occasional - 4 drink per week     Drug use: No        Medications:    apixaban ANTICOAGULANT (ELIQUIS) 5 MG tablet  artificial tears OINT ophthalmic ointment  carboxymethylcellulose (CELLUVISC/REFRESH LIQUIGEL) 1 % ophthalmic solution  fluticasone (FLONASE) 50 MCG/ACT spray  gabapentin (NEURONTIN) 400 MG capsule  gabapentin (NEURONTIN) 400 MG capsule  hypromellose (GENTEAL) 0.3 % SOLN ophthalmic solution  lidocaine (LMX4) 4 % CREA cream  loratadine (CLARITIN) 10 MG tablet  magnesium oxide 400 MG CAPS  NONFORMULARY  omeprazole (PRILOSEC) 20 MG CR capsule  polyethylene glycol (MIRALAX/GLYCOLAX) powder  rOPINIRole (REQUIP) 1 MG tablet  ROPINIROLE HCL PO          Review of Systems   Constitutional: Negative for chills and fever.   HENT: Negative for congestion.    Eyes: Negative for visual disturbance.   Respiratory: Negative for chest tightness and shortness of breath.    Cardiovascular: Positive for chest pain.   Gastrointestinal: Negative for abdominal pain.   Genitourinary: Negative for hematuria.   Musculoskeletal: Negative for back pain.   Skin: Negative for rash and wound.   Neurological: Negative for syncope.   Hematological: Negative for adenopathy. Does not bruise/bleed easily.   Psychiatric/Behavioral: Negative for confusion.       Physical Exam          Physical Exam  Constitutional:       General: He is not in acute distress.     Appearance: He is not ill-appearing, toxic-appearing or diaphoretic.    HENT:      Head: Atraumatic.      Right Ear: Tympanic membrane normal. No drainage or tenderness.      Left Ear: Tympanic membrane normal. No drainage or tenderness.      Nose: Nose normal. No rhinorrhea.   Eyes:      General: No scleral icterus.     Extraocular Movements: Extraocular movements intact.      Right eye: Normal extraocular motion and no nystagmus.      Left eye: Normal extraocular motion and no nystagmus.      Pupils: Pupils are equal, round, and reactive to light. Pupils are equal.      Funduscopic exam:     Right eye: No AV nicking or papilledema. Red reflex present.         Left eye: No AV nicking or papilledema. Red reflex present.  Neck:      Musculoskeletal: Normal range of motion. No neck rigidity, pain with movement or muscular tenderness.      Vascular: No JVD.      Trachea: No tracheal deviation.   Cardiovascular:      Rate and Rhythm: Normal rate and regular rhythm.      Heart sounds: Normal heart sounds. No friction rub.   Pulmonary:      Effort: No respiratory distress.      Breath sounds: Normal breath sounds. No stridor.      Comments: Lung sounds are clear but decreased throughout.  SaO2 is 98% on room air.  Abdominal:      General: Bowel sounds are normal.      Palpations: Abdomen is soft.      Tenderness: There is no abdominal tenderness. There is no guarding or rebound.   Musculoskeletal: Normal range of motion.         General: No tenderness.   Lymphadenopathy:      Cervical: No cervical adenopathy.      Right cervical: No superficial cervical adenopathy.     Left cervical: No superficial cervical adenopathy.   Skin:     General: Skin is warm.      Capillary Refill: Capillary refill takes less than 2 seconds.      Findings: No rash.   Neurological:      General: No focal deficit present.      Mental Status: He is alert and oriented to person, place, and time.         ED Course     EKG shows sinus tachycardia.  Anterior infarct, age undetermined heart rate is 105.  This is  essentially unchanged from previous EKG on 12/9/2019.    Results for orders placed or performed during the hospital encounter of 04/08/20 (from the past 24 hour(s))   CBC with platelets differential   Result Value Ref Range    WBC 3.9 (L) 4.0 - 11.0 10e9/L    RBC Count 4.78 4.4 - 5.9 10e12/L    Hemoglobin 14.0 13.3 - 17.7 g/dL    Hematocrit 43.2 40.0 - 53.0 %    MCV 90 78 - 100 fl    MCH 29.3 26.5 - 33.0 pg    MCHC 32.4 31.5 - 36.5 g/dL    RDW 13.3 10.0 - 15.0 %    Platelet Count 202 150 - 450 10e9/L    Diff Method Automated Method     % Neutrophils 49.8 %    % Lymphocytes 35.0 %    % Monocytes 12.1 %    % Eosinophils 2.3 %    % Basophils 0.5 %    % Immature Granulocytes 0.3 %    Absolute Neutrophil 1.9 1.6 - 8.3 10e9/L    Absolute Lymphocytes 1.4 0.8 - 5.3 10e9/L    Absolute Monocytes 0.5 0.0 - 1.3 10e9/L    Absolute Eosinophils 0.1 0.0 - 0.7 10e9/L    Absolute Basophils 0.0 0.0 - 0.2 10e9/L    Abs Immature Granulocytes 0.0 0 - 0.4 10e9/L   INR   Result Value Ref Range    INR 1.09 0 - 1.3   Comprehensive metabolic panel   Result Value Ref Range    Sodium 138 134 - 144 mmol/L    Potassium 3.8 3.5 - 5.1 mmol/L    Chloride 103 98 - 107 mmol/L    Carbon Dioxide 30 21 - 31 mmol/L    Anion Gap 5 3 - 14 mmol/L    Glucose 99 70 - 105 mg/dL    Urea Nitrogen 19 7 - 25 mg/dL    Creatinine 1.06 0.70 - 1.30 mg/dL    GFR Estimate 71 >60 mL/min/[1.73_m2]    GFR Estimate If Black 86 >60 mL/min/[1.73_m2]    Calcium 9.0 8.6 - 10.3 mg/dL    Bilirubin Total 0.4 0.3 - 1.0 mg/dL    Albumin 4.0 3.5 - 5.7 g/dL    Protein Total 6.9 6.4 - 8.9 g/dL    Alkaline Phosphatase 99 34 - 104 U/L    ALT 30 7 - 52 U/L    AST 22 13 - 39 U/L   Troponin GH   Result Value Ref Range    Troponin 2.5 <34.0 pg/mL   Nt probnp inpatient (BNP)   Result Value Ref Range    N-Terminal Pro BNP Inpatient 9 0 - 100 pg/mL   Lactic acid whole blood STAT   Result Value Ref Range    Lactic Acid 1.0 0.7 - 2.0 mmol/L   XR Chest 2 Views    Narrative    PROCEDURE:  XR CHEST 2  VW    HISTORY:  chest pain.     COMPARISON:  None.    FINDINGS:   The cardiac silhouette is normal in size. The pulmonary vasculature is  normal.  There is some linear atelectasis or scarring at the left lung  base No pleural effusion or pneumothorax.      Impression    IMPRESSION:  Minimal linear atelectasis or scarring at the left lung  base      JULIO MORELOS MD   Troponin GH   Result Value Ref Range    Troponin <2.3 <34.0 pg/mL       Medications   0.9% sodium chloride BOLUS (0 mLs Intravenous Stopped 4/8/20 1752)     Followed by   sodium chloride 0.9% infusion (has no administration in time range)   alum & mag hydroxide-simethicone (MAALOX  ES) suspension 15 mL (15 mLs Oral Given 4/8/20 1658)     And   lidocaine (XYLOCAINE) 2 % solution 15 mL (15 mLs Mouth/Throat Given 4/8/20 1658)       Assessments & Plan (with Medical Decision Making)     I have reviewed the nursing notes.    I have reviewed the findings, diagnosis, plan and need for follow up with the patient.      Discharge Medication List as of 4/8/2020  6:32 PM      START taking these medications    Details   Simethicone 125 MG CAPS Take 1 capsule by mouth 4 times daily (with meals and nightly), Disp-60 capsule,R-0, Local Print         Lisinopril increased to 20 mg p.o. daily    Final diagnoses:   Atypical chest pain   HTN (hypertension)   Gastroesophageal reflux disease     Afebrile.  Vital signs stable but noted slightly elevated blood pressures in the 150-160/100s.  Patient with recent diagnosis of bilateral PEs and currently on Eliquis.  IV established and he was given fluids.   EKG shows sinus tachycardia.  Anterior infarct, age undetermined heart rate is 105.  This is essentially unchanged from previous EKG on 12/9/2019.  Initial troponin is normal.  BNP is unremarkable.  CBC shows decreased white blood cells at 3.9 with no left shift but reviewing his previous CBCs this is only slightly lower than his normal range.  He does tend to run low.  INR  is normal.  CMP is unremarkable.  Lactate is normal.  Chest x-ray shows minimal linear atelectasis of his left lower lobe.  However given his white blood cells unremarkable.  The patient was given a GI cocktail which seemed to help with his symptoms.  He is on current medication at home for reflux as well.  90-minute troponin returns normal as well.  He essentially reports being pain-free at this point.  Hypertension and GERD.  We will increase his lisinopril to 20 mg daily and a prescription was written for this.  Rx as well for simethicone that he can take 4 times daily.  He is to continue to monitor his blood pressure and follow-up with his primary care provider if symptoms persist for further evaluation as needed within 1 week sooner if there is any other concerns problems or questions.  4/8/2020   [facility]     Daryn Palumbo PA-C  04/08/20 1912

## 2020-04-08 NOTE — ED NOTES
"Patient states that he has a \"weird sensation\" when checked for any chest discomfort.  Patient then shows me his phone which recorded his HR while watching a movie yesterday and it shows HR .  He says the [facility] did have [facility], but doesn't recall what they told him regarding the results.   "

## 2020-04-13 LAB — INTERPRETATION ECG - MUSE: NORMAL

## 2020-05-29 ENCOUNTER — HOSPITAL ENCOUNTER (EMERGENCY)
Facility: OTHER | Age: 65
Discharge: HOME | End: 2020-05-29
Attending: PHYSICIAN ASSISTANT | Admitting: PHYSICIAN ASSISTANT
Payer: COMMERCIAL

## 2020-05-29 ENCOUNTER — APPOINTMENT (OUTPATIENT)
Dept: CT IMAGING | Facility: OTHER | Age: 65
End: 2020-05-29
Attending: PHYSICIAN ASSISTANT
Payer: COMMERCIAL

## 2020-05-29 ENCOUNTER — APPOINTMENT (OUTPATIENT)
Dept: GENERAL RADIOLOGY | Facility: OTHER | Age: 65
End: 2020-05-29
Attending: PHYSICIAN ASSISTANT
Payer: COMMERCIAL

## 2020-05-29 VITALS
RESPIRATION RATE: 14 BRPM | SYSTOLIC BLOOD PRESSURE: 137 MMHG | TEMPERATURE: 98.2 F | HEART RATE: 95 BPM | HEIGHT: 65 IN | BODY MASS INDEX: 36.9 KG/M2 | WEIGHT: 221.5 LBS | OXYGEN SATURATION: 97 % | DIASTOLIC BLOOD PRESSURE: 91 MMHG

## 2020-05-29 DIAGNOSIS — R07.9 CHEST PAIN: ICD-10-CM

## 2020-05-29 PROBLEM — G25.81 RESTLESS LEGS: Status: ACTIVE | Noted: 2020-05-29

## 2020-05-29 PROBLEM — K21.9 GASTROESOPHAGEAL REFLUX DISEASE: Status: ACTIVE | Noted: 2020-05-29

## 2020-05-29 PROBLEM — M19.90 OSTEOARTHRITIS: Status: ACTIVE | Noted: 2020-05-29

## 2020-05-29 PROBLEM — J30.9 ALLERGIC RHINITIS: Status: ACTIVE | Noted: 2020-05-29

## 2020-05-29 PROBLEM — F32.A DEPRESSIVE DISORDER: Status: ACTIVE | Noted: 2020-05-29

## 2020-05-29 PROBLEM — G47.33 OBSTRUCTIVE SLEEP APNEA SYNDROME: Status: ACTIVE | Noted: 2020-05-29

## 2020-05-29 PROBLEM — D12.6 BENIGN NEOPLASM OF COLON: Status: ACTIVE | Noted: 2020-05-29

## 2020-05-29 PROBLEM — I10 ESSENTIAL HYPERTENSION: Status: ACTIVE | Noted: 2020-05-29

## 2020-05-29 PROBLEM — R03.0 ELEVATED BLOOD-PRESSURE READING WITHOUT DIAGNOSIS OF HYPERTENSION: Status: ACTIVE | Noted: 2020-05-29

## 2020-05-29 PROBLEM — H91.90 HEARING LOSS: Status: ACTIVE | Noted: 2020-05-29

## 2020-05-29 PROBLEM — B00.9 HERPES SIMPLEX WITHOUT COMPLICATION: Status: ACTIVE | Noted: 2020-05-29

## 2020-05-29 PROBLEM — H52.4 PRESBYOPIA: Status: ACTIVE | Noted: 2020-05-29

## 2020-05-29 LAB
ALBUMIN SERPL-MCNC: 4.3 G/DL (ref 3.5–5.7)
ALP SERPL-CCNC: 90 U/L (ref 34–104)
ALT SERPL W P-5'-P-CCNC: 27 U/L (ref 7–52)
ANION GAP SERPL CALCULATED.3IONS-SCNC: 10 MMOL/L (ref 3–14)
APTT PPP: 36 SEC (ref 22–37)
AST SERPL W P-5'-P-CCNC: 25 U/L (ref 13–39)
BASOPHILS # BLD AUTO: 0 10E9/L (ref 0–0.2)
BASOPHILS NFR BLD AUTO: 0.5 %
BILIRUB SERPL-MCNC: 0.4 MG/DL (ref 0.3–1)
BUN SERPL-MCNC: 21 MG/DL (ref 7–25)
CALCIUM SERPL-MCNC: 9.6 MG/DL (ref 8.6–10.3)
CHLORIDE SERPL-SCNC: 105 MMOL/L (ref 98–107)
CO2 SERPL-SCNC: 27 MMOL/L (ref 21–31)
CREAT SERPL-MCNC: 1.02 MG/DL (ref 0.7–1.3)
DIFFERENTIAL METHOD BLD: NORMAL
EOSINOPHIL # BLD AUTO: 0.1 10E9/L (ref 0–0.7)
EOSINOPHIL NFR BLD AUTO: 2 %
ERYTHROCYTE [DISTWIDTH] IN BLOOD BY AUTOMATED COUNT: 13.1 % (ref 10–15)
GFR SERPL CREATININE-BSD FRML MDRD: 74 ML/MIN/{1.73_M2}
GLUCOSE SERPL-MCNC: 87 MG/DL (ref 70–105)
HCT VFR BLD AUTO: 43.2 % (ref 40–53)
HGB BLD-MCNC: 14.1 G/DL (ref 13.3–17.7)
IMM GRANULOCYTES # BLD: 0 10E9/L (ref 0–0.4)
IMM GRANULOCYTES NFR BLD: 0.4 %
INR PPP: 1.02 (ref 0–1.3)
LYMPHOCYTES # BLD AUTO: 2 10E9/L (ref 0.8–5.3)
LYMPHOCYTES NFR BLD AUTO: 34.8 %
MCH RBC QN AUTO: 29.4 PG (ref 26.5–33)
MCHC RBC AUTO-ENTMCNC: 32.6 G/DL (ref 31.5–36.5)
MCV RBC AUTO: 90 FL (ref 78–100)
MONOCYTES # BLD AUTO: 0.7 10E9/L (ref 0–1.3)
MONOCYTES NFR BLD AUTO: 12.3 %
NEUTROPHILS # BLD AUTO: 2.8 10E9/L (ref 1.6–8.3)
NEUTROPHILS NFR BLD AUTO: 50 %
NT-PROBNP SERPL-MCNC: 29 PG/ML (ref 0–100)
PLATELET # BLD AUTO: 212 10E9/L (ref 150–450)
POTASSIUM SERPL-SCNC: 4 MMOL/L (ref 3.5–5.1)
PROT SERPL-MCNC: 7.4 G/DL (ref 6.4–8.9)
RBC # BLD AUTO: 4.79 10E12/L (ref 4.4–5.9)
SODIUM SERPL-SCNC: 142 MMOL/L (ref 134–144)
TROPONIN I SERPL-MCNC: 3.7 PG/ML
TROPONIN I SERPL-MCNC: 3.7 PG/ML
WBC # BLD AUTO: 5.6 10E9/L (ref 4–11)

## 2020-05-29 PROCEDURE — 71275 CT ANGIOGRAPHY CHEST: CPT

## 2020-05-29 PROCEDURE — 93005 ELECTROCARDIOGRAM TRACING: CPT | Performed by: PHYSICIAN ASSISTANT

## 2020-05-29 PROCEDURE — 83880 ASSAY OF NATRIURETIC PEPTIDE: CPT | Performed by: PHYSICIAN ASSISTANT

## 2020-05-29 PROCEDURE — 71045 X-RAY EXAM CHEST 1 VIEW: CPT

## 2020-05-29 PROCEDURE — 99284 EMERGENCY DEPT VISIT MOD MDM: CPT | Mod: Z6 | Performed by: PHYSICIAN ASSISTANT

## 2020-05-29 PROCEDURE — 84484 ASSAY OF TROPONIN QUANT: CPT | Performed by: PHYSICIAN ASSISTANT

## 2020-05-29 PROCEDURE — 80053 COMPREHEN METABOLIC PANEL: CPT | Performed by: PHYSICIAN ASSISTANT

## 2020-05-29 PROCEDURE — 85730 THROMBOPLASTIN TIME PARTIAL: CPT | Performed by: PHYSICIAN ASSISTANT

## 2020-05-29 PROCEDURE — 25500064 ZZH RX 255 OP 636: Performed by: PHYSICIAN ASSISTANT

## 2020-05-29 PROCEDURE — 85610 PROTHROMBIN TIME: CPT | Performed by: PHYSICIAN ASSISTANT

## 2020-05-29 PROCEDURE — 36415 COLL VENOUS BLD VENIPUNCTURE: CPT | Mod: XU | Performed by: PHYSICIAN ASSISTANT

## 2020-05-29 PROCEDURE — 85025 COMPLETE CBC W/AUTO DIFF WBC: CPT | Performed by: PHYSICIAN ASSISTANT

## 2020-05-29 PROCEDURE — 99285 EMERGENCY DEPT VISIT HI MDM: CPT | Mod: 25 | Performed by: PHYSICIAN ASSISTANT

## 2020-05-29 PROCEDURE — 93010 ELECTROCARDIOGRAM REPORT: CPT | Performed by: INTERNAL MEDICINE

## 2020-05-29 RX ADMIN — IOHEXOL 100 ML: 350 INJECTION, SOLUTION INTRAVENOUS at 17:15

## 2020-05-29 ASSESSMENT — ENCOUNTER SYMPTOMS
BRUISES/BLEEDS EASILY: 0
WOUND: 0
CHILLS: 0
ADENOPATHY: 0
FEVER: 0
HEMATURIA: 0
BACK PAIN: 0
ABDOMINAL PAIN: 0
CONFUSION: 0
CHEST TIGHTNESS: 0
SHORTNESS OF BREATH: 0

## 2020-05-29 ASSESSMENT — MIFFLIN-ST. JEOR: SCORE: 1731.6

## 2020-05-29 NOTE — ED PROVIDER NOTES
"  History     Chief Complaint   Patient presents with     Chest Pain     HPI  Delvin Gray is a 64 year old male who presents to the ED for evaluation of chest pain.  He reports that this afternoon approximately 2 to 3 hours prior to arrival he began to have central chest pain that traveled up into his neck that he rated about a 7 out of 10.  In the emergency department he rates it as a 1 out of 10.  He also reports that over the last week he has had some intermittent tingling type pains that he describes as a \"ding\" in his neck and also his left arm.  He has a past significant history of pulmonary embolism and is on Eliquis which he does say he has been taking sporadically recently.  He also has hypertension and thinks that his mother may have had cardiac issues before the age of 65.  He does not smoke and he has no high cholesterol and is not diabetic.  He denies any abdominal pain, cough, recent fevers or sicknesses or swelling in his legs.    Allergies:  Allergies   Allergen Reactions     Seasonal Allergies      Other reaction(s): Other - Describe In Comment Field  SEASONAL, Reaction: runny nose and watery eyes       Problem List:    Patient Active Problem List    Diagnosis Date Noted     Allergic rhinitis 05/29/2020     Priority: Medium     Benign neoplasm of colon 05/29/2020     Priority: Medium     Depressive disorder 05/29/2020     Priority: Medium     Elevated blood-pressure reading without diagnosis of hypertension 05/29/2020     Priority: Medium     Essential hypertension 05/29/2020     Priority: Medium     Gastroesophageal reflux disease 05/29/2020     Priority: Medium     Hearing loss 05/29/2020     Priority: Medium     Herpes simplex without complication 05/29/2020     Priority: Medium     Obstructive sleep apnea syndrome 05/29/2020     Priority: Medium     Osteoarthritis 05/29/2020     Priority: Medium     Presbyopia 05/29/2020     Priority: Medium     Restless legs 05/29/2020     Priority: " Medium     Pulmonary emboli (H) 2019     Priority: Medium     Ulceration of transverse colon 2019     Priority: Medium     H/O adenomatous polyp of colon 2019     Priority: Medium     Environmental allergies 2012     Priority: Medium     Obesity 2012     Priority: Medium     Chronic hepatitis B virus infection (H) 2012     Priority: Medium        Past Medical History:    History reviewed. No pertinent past medical history.    Past Surgical History:    Past Surgical History:   Procedure Laterality Date     COLONOSCOPY N/A 2019    F/U  serrated adenoma       Family History:    Family History   Problem Relation Age of Onset     No Known Problems Other         No known history of bleeding or clotting disorders in the family       Social History:  Marital Status:  Single [1]  Social History     Tobacco Use     Smoking status: Former Smoker     Packs/day: 0.00     Last attempt to quit: 1996     Years since quittin.6     Smokeless tobacco: Former User   Substance Use Topics     Alcohol use: Yes     Alcohol/week: 1.0 standard drinks     Types: 1 Cans of beer per week     Comment:  occasional - 4 drink per week     Drug use: No        Medications:    apixaban ANTICOAGULANT (ELIQUIS) 5 MG tablet  cyclobenzaprine (FLEXERIL) 10 MG tablet  DULoxetine (CYMBALTA) 60 MG capsule  fluticasone (FLONASE) 50 MCG/ACT spray  gabapentin (NEURONTIN) 400 MG capsule  gabapentin (NEURONTIN) 400 MG capsule  lisinopril (ZESTRIL) 10 MG tablet  loratadine (CLARITIN) 10 MG tablet  magnesium oxide 400 MG CAPS  metoprolol tartrate (LOPRESSOR) 25 MG tablet  rOPINIRole (REQUIP) 1 MG tablet  ROPINIROLE HCL PO  artificial tears OINT ophthalmic ointment  carboxymethylcellulose (CELLUVISC/REFRESH LIQUIGEL) 1 % ophthalmic solution  hypromellose (GENTEAL) 0.3 % SOLN ophthalmic solution  lidocaine (LMX4) 4 % CREA cream  NONFORMULARY  pantoprazole (PROTONIX) 40 MG EC tablet  polyethylene glycol  "(MIRALAX/GLYCOLAX) powder  Simethicone 125 MG CAPS          Review of Systems   Constitutional: Negative for chills and fever.   HENT: Negative for congestion.    Eyes: Negative for visual disturbance.   Respiratory: Negative for chest tightness and shortness of breath.    Cardiovascular: Positive for chest pain.   Gastrointestinal: Negative for abdominal pain.   Genitourinary: Negative for hematuria.   Musculoskeletal: Negative for back pain.   Skin: Negative for rash and wound.   Neurological: Negative for syncope.   Hematological: Negative for adenopathy. Does not bruise/bleed easily.   Psychiatric/Behavioral: Negative for confusion.       Physical Exam   BP: (!) 167/106  Pulse: 106  Heart Rate: 109  Temp: 97.5  F (36.4  C)  Resp: 15  Height: 165.1 cm (5' 5\")  Weight: 100.5 kg (221 lb 8 oz)  SpO2: 99 %      Physical Exam  Constitutional:       General: He is not in acute distress.     Appearance: He is well-developed. He is not diaphoretic.   HENT:      Head: Normocephalic and atraumatic.   Eyes:      General: No scleral icterus.     Extraocular Movements: Extraocular movements intact.      Conjunctiva/sclera: Conjunctivae normal.      Pupils: Pupils are equal, round, and reactive to light.   Neck:      Musculoskeletal: Neck supple.   Cardiovascular:      Rate and Rhythm: Normal rate and regular rhythm.      Heart sounds: Normal heart sounds. No murmur.   Pulmonary:      Effort: Pulmonary effort is normal.      Breath sounds: Normal breath sounds.   Abdominal:      Palpations: Abdomen is soft.      Tenderness: There is no abdominal tenderness.   Musculoskeletal: Normal range of motion.         General: No deformity.      Right lower leg: No edema.      Left lower leg: No edema.   Lymphadenopathy:      Cervical: No cervical adenopathy.   Skin:     General: Skin is warm and dry.      Findings: No rash.   Neurological:      Mental Status: He is alert and oriented to person, place, and time. Mental status is at " baseline.   Psychiatric:         Mood and Affect: Mood normal. Mood is not anxious.         Behavior: Behavior normal. Behavior is not agitated.         ED Course        Procedures          EKG read at 1622.  Heart rate 103, sinus tachycardia, no ST changes.    Critical Care time:  none               Results for orders placed or performed during the hospital encounter of 05/29/20 (from the past 24 hour(s))   CBC with platelets differential   Result Value Ref Range    WBC 5.6 4.0 - 11.0 10e9/L    RBC Count 4.79 4.4 - 5.9 10e12/L    Hemoglobin 14.1 13.3 - 17.7 g/dL    Hematocrit 43.2 40.0 - 53.0 %    MCV 90 78 - 100 fl    MCH 29.4 26.5 - 33.0 pg    MCHC 32.6 31.5 - 36.5 g/dL    RDW 13.1 10.0 - 15.0 %    Platelet Count 212 150 - 450 10e9/L    Diff Method Automated Method     % Neutrophils 50.0 %    % Lymphocytes 34.8 %    % Monocytes 12.3 %    % Eosinophils 2.0 %    % Basophils 0.5 %    % Immature Granulocytes 0.4 %    Absolute Neutrophil 2.8 1.6 - 8.3 10e9/L    Absolute Lymphocytes 2.0 0.8 - 5.3 10e9/L    Absolute Monocytes 0.7 0.0 - 1.3 10e9/L    Absolute Eosinophils 0.1 0.0 - 0.7 10e9/L    Absolute Basophils 0.0 0.0 - 0.2 10e9/L    Abs Immature Granulocytes 0.0 0 - 0.4 10e9/L   Troponin GH   Result Value Ref Range    Troponin 3.7 <34.0 pg/mL   INR   Result Value Ref Range    INR 1.02 0 - 1.3   Partial thromboplastin time   Result Value Ref Range    PTT 36 22 - 37 sec   Comprehensive metabolic panel   Result Value Ref Range    Sodium 142 134 - 144 mmol/L    Potassium 4.0 3.5 - 5.1 mmol/L    Chloride 105 98 - 107 mmol/L    Carbon Dioxide 27 21 - 31 mmol/L    Anion Gap 10 3 - 14 mmol/L    Glucose 87 70 - 105 mg/dL    Urea Nitrogen 21 7 - 25 mg/dL    Creatinine 1.02 0.70 - 1.30 mg/dL    GFR Estimate 74 >60 mL/min/[1.73_m2]    GFR Estimate If Black 90 >60 mL/min/[1.73_m2]    Calcium 9.6 8.6 - 10.3 mg/dL    Bilirubin Total 0.4 0.3 - 1.0 mg/dL    Albumin 4.3 3.5 - 5.7 g/dL    Protein Total 7.4 6.4 - 8.9 g/dL    Alkaline  "Phosphatase 90 34 - 104 U/L    ALT 27 7 - 52 U/L    AST 25 13 - 39 U/L   Nt probnp inpatient (BNP)   Result Value Ref Range    N-Terminal Pro BNP Inpatient 29 0 - 100 pg/mL   XR Chest Port 1 View    Narrative    PROCEDURE:  XR CHEST PORT 1 VW    HISTORY: chest pain. .    COMPARISON:  4/8/2020    FINDINGS:    The cardiomediastinal contours are stable.  No focal consolidation, effusion or pneumothorax.      Impression    IMPRESSION:  Stable portable chest.      VINCE THOMPSON MD   CT Chest Pulmonary Embolism w Contrast    Narrative    PROCEDURE:  CT CHEST PULMONARY EMBOLISM W CONTRAST.    HISTORY:  Evaluate for pulmonary embolism.  Hx of PE, is on Eliquis,  but missed some doses. Chest pain today with recent left extremity  \"lightness\". PE? Aorta?    TECHNIQUE:  Initial pre-contrast  and localizer images were  obtained.  Contrast enhanced helical CT pulmonary angiography was then  performed.  Routine transaxial and post-processed (multiplanar and/or  MIP) reformations were obtained.    COMPARISON:  12/9/2019    MEDS/CONTRAST: Omnipaque 350, 100 mL IV    PULMONARY CTA FINDINGS:  This is a diagnostic quality helical CT  pulmonary angiogram.  There is no acute pulmonary embolism to the  first subsegmental pulmonary artery level.    OTHER FINDINGS:      The heart size is unchanged. There is no pericardial thickening or  effusions. There is no mediastinal, hilar, or axillary  lymphadenopathy. The thoracic aorta and main pulmonary artery are  within normal limits in size.     Limited views of the upper abdomen reveal no adrenal mass or acute  process.     The pleura is without thickening or effusions. The central airways are  unremarkable. No focal consolidation or intrapulmonary mass is  identified.    No suspicious osseous lesion is identified.      Impression    IMPRESSION:    No acute pulmonary emboli to the subsegmental level.    VINCE THOMPSON MD   Troponin GH (now)   Result Value Ref Range    Troponin " 3.7 <34.0 pg/mL       Medications   iohexol (OMNIPAQUE) 350 mg/mL solution 100 mL (100 mLs Intravenous Given 5/29/20 1715)       Assessments & Plan (with Medical Decision Making)     Pt nontoxic in NAD. Heart, lung, bowel sounds normal, abd soft, nontender to palpation, nondistended. VSS, afebrile    He has good equal peripheral pulses in all extremities.  Rates his chest pain is a 0.5-1 out of 10.    Patient is very slightly tachycardic but otherwise had a well-appearing EKG.  He has 2- troponins, BNP 29, and otherwise grossly normal lab work.    Given his concern for some abnormal discomforts throughout his body along with his sporadic Eliquis administration a CT PE study was ordered which appears normal.    Heart score: 3    Upon reassessment of the patient he appears to be doing very well with no chest pain.  I discussed my findings with the patient.  It is unclear what is causing his discomfort today but he does not appear to be suffering from an aortic dissection, pulmonary embolism, pneumothorax, esophageal rupture, ACS, and he appears to be at low risk for major adverse cardiac event in the next 6 weeks.    A stress echocardiogram was referred for him as an outpatient.  A referral was also placed for him to follow-up with cardiology.  He is told that if he has worsening or concerning symptoms he should return the ED for further evaluation.  He understands and agrees with the plan and the patient is discharged.    Damien Mcnamara PA-C         I have reviewed the nursing notes.    I have reviewed the findings, diagnosis, plan and need for follow up with the patient.       Discharge Medication List as of 5/29/2020  7:13 PM          Final diagnoses:   Chest pain       5/29/2020   [facility]     Damien Mcnamara PA  05/29/20 1933

## 2020-05-29 NOTE — ED AVS SNAPSHOT
[facility]  [address]  [place] [address]  Phone:  [phone]  Fax:  [phone]                                    Delvin Gray   MRN: [id number]    Department:  [facility]   Date of Visit:  5/29/2020           After Visit Summary Signature Page    I have received my discharge instructions, and my questions have been answered. I have discussed any challenges I see with this plan with the nurse or doctor.    ..........................................................................................................................................  Patient/Patient Representative Signature      ..........................................................................................................................................  Patient Representative Print Name and Relationship to Patient    ..................................................               ................................................  Date                                   Time    ..........................................................................................................................................  Reviewed by Signature/Title    ...................................................              ..............................................  Date                                               Time          22EPIC Rev 08/18

## 2020-05-30 NOTE — DISCHARGE INSTRUCTIONS
Get plenty of fluids and rest.  As we discussed, it is unclear what is causing your chest pain today, however you have very reassuring physical exam, vital signs, lab work and imaging.  At this time you appear to be at a low risk for major adverse cardiac events in the next 6 weeks however you are very borderline to being in the higher risk group.  If you have worsening or concerning symptoms please return to the emergency department for further evaluation.  A referral was placed for you to obtain a stress echocardiogram as well as follow-up with cardiology.

## 2020-06-01 LAB — INTERPRETATION ECG - MUSE: NORMAL

## 2020-06-02 ENCOUNTER — TELEPHONE (OUTPATIENT)
Dept: CARDIOLOGY | Facility: OTHER | Age: 65
End: 2020-06-02

## 2020-06-02 NOTE — TELEPHONE ENCOUNTER
Pt verified his .  Called re:  Order for stress echocardiogram from ER visit on 20.  Patient states [facility] has [facility] and is waiting for [facility] authorization for the stress test.  He does not want to schedule it until it is authorized by [facility].  He verbalized understanding to seek medical attention if his chest pain returns.

## 2020-06-03 ENCOUNTER — TRANSFERRED RECORDS (OUTPATIENT)
Dept: HEALTH INFORMATION MANAGEMENT | Facility: OTHER | Age: 65
End: 2020-06-03

## 2020-06-10 ENCOUNTER — HOSPITAL ENCOUNTER (OUTPATIENT)
Dept: MRI IMAGING | Facility: OTHER | Age: 65
Discharge: HOME | End: 2020-06-10
Attending: NURSE PRACTITIONER | Admitting: FAMILY MEDICINE
Payer: COMMERCIAL

## 2020-06-10 DIAGNOSIS — R41.0 DISORIENTATION: ICD-10-CM

## 2020-06-10 PROCEDURE — 70551 MRI BRAIN STEM W/O DYE: CPT

## 2020-06-18 ENCOUNTER — HOSPITAL ENCOUNTER (OUTPATIENT)
Dept: CARDIOLOGY | Facility: OTHER | Age: 65
Discharge: HOME | End: 2020-06-18
Attending: NURSE PRACTITIONER | Admitting: NURSE PRACTITIONER
Payer: COMMERCIAL

## 2020-06-18 DIAGNOSIS — R41.0 DISORIENTATION: ICD-10-CM

## 2020-06-18 DIAGNOSIS — R42 DIZZINESS: ICD-10-CM

## 2020-06-18 PROCEDURE — 93306 TTE W/DOPPLER COMPLETE: CPT | Mod: 26 | Performed by: INTERNAL MEDICINE

## 2020-06-18 PROCEDURE — 93306 TTE W/DOPPLER COMPLETE: CPT

## 2020-06-23 ENCOUNTER — HOSPITAL ENCOUNTER (EMERGENCY)
Facility: OTHER | Age: 65
Discharge: HOME | End: 2020-06-23
Attending: PHYSICIAN ASSISTANT | Admitting: PHYSICIAN ASSISTANT
Payer: COMMERCIAL

## 2020-06-23 ENCOUNTER — APPOINTMENT (OUTPATIENT)
Dept: CT IMAGING | Facility: OTHER | Age: 65
End: 2020-06-23
Attending: PHYSICIAN ASSISTANT

## 2020-06-23 VITALS
OXYGEN SATURATION: 96 % | HEART RATE: 101 BPM | WEIGHT: 221 LBS | TEMPERATURE: 98 F | RESPIRATION RATE: 16 BRPM | BODY MASS INDEX: 36.78 KG/M2 | SYSTOLIC BLOOD PRESSURE: 158 MMHG | DIASTOLIC BLOOD PRESSURE: 80 MMHG
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
## 2020-06-23 DIAGNOSIS — S00.93XA: ICD-10-CM

## 2020-06-23 PROCEDURE — 99283 EMERGENCY DEPT VISIT LOW MDM: CPT | Mod: Z6 | Performed by: PHYSICIAN ASSISTANT

## 2020-06-23 PROCEDURE — 99284 EMERGENCY DEPT VISIT MOD MDM: CPT | Mod: 25 | Performed by: PHYSICIAN ASSISTANT

## 2020-06-23 PROCEDURE — 72125 CT NECK SPINE W/O DYE: CPT

## 2020-06-23 PROCEDURE — 70450 CT HEAD/BRAIN W/O DYE: CPT

## 2020-06-23 NOTE — DISCHARGE INSTRUCTIONS
Get plenty of fluids and rest.  You can take Tylenol ibuprofen as well as use ice to help with the hematoma on the back of your head.  As we discussed, the imaging of your head and neck appears very well today with no acute injuries.  We discussed obtaining further studies for your chronic problem of tunnel vision but due to ongoing large work-ups for this problem we did not obtain any further studies today.  If you have worsening or concerning symptoms please return for further evaluation.  Avoid climbing any high ladders or putting yourself in a position may cause injury if you do have another tunnel vision type episode.

## 2020-06-23 NOTE — ED TRIAGE NOTES
Pt here by himself with c/o fall off a ladder about 3 feet onto concrete, pt reports hitting the back of his head and abrasions to rt arm and rt left, pt doesn't think that he was knocked out, VSS, pt brought back into ER to be evaluated, pt reports stopping eliquis last week

## 2020-06-23 NOTE — ED AVS SNAPSHOT
[facility]  [address]  [place] [address]  Phone:  [phone]  Fax:  [phone]                                    Delvin Gray   MRN: [id number]    Department:  [facility]   Date of Visit:  6/23/2020           After Visit Summary Signature Page    I have received my discharge instructions, and my questions have been answered. I have discussed any challenges I see with this plan with the nurse or doctor.    ..........................................................................................................................................  Patient/Patient Representative Signature      ..........................................................................................................................................  Patient Representative Print Name and Relationship to Patient    ..................................................               ................................................  Date                                   Time    ..........................................................................................................................................  Reviewed by Signature/Title    ...................................................              ..............................................  Date                                               Time          22EPIC Rev 08/18

## 2020-06-24 ASSESSMENT — ENCOUNTER SYMPTOMS
CHILLS: 0
FEVER: 0
BRUISES/BLEEDS EASILY: 0
CHEST TIGHTNESS: 0
WOUND: 0
HEMATURIA: 0
ADENOPATHY: 0
HEADACHES: 1
BACK PAIN: 0
SHORTNESS OF BREATH: 0
CONFUSION: 0
ABDOMINAL PAIN: 0

## 2020-06-24 NOTE — ED PROVIDER NOTES
"  History     Chief Complaint   Patient presents with     Fall     Headache     HPI  Delvin Gray is a 64 year old male who presents to the ED for evaluation of a fall and headache.  Just prior to arrival the patient was at work and coming down from all ladder about 3 feet high when he reports having a sudden onset of tunnel vision.  This caused him to miss a step and he fell backwards hitting the back of his head.  He denies loss of consciousness or neck pain.  He was on Eliqui for pulmonary emboli but was stopped approximately 1 week ago as his symptoms had resolved.  Patient has been suffering from intermittent bouts of tunnel vision for quite some time and has had multiple work-ups regarding this all without significant findings at this point.  He also explains that when his tunnel vision occurs he does hear \"dogs barking\".  He denies any other injuries.  He also denies any chest pain, shortness of breath, fevers, sicknesses, abdominal pain, dysuria or diarrhea.    Allergies:  Allergies   Allergen Reactions     Seasonal Allergies      Other reaction(s): Other - Describe In Comment Field  SEASONAL, Reaction: runny nose and watery eyes       Problem List:    Patient Active Problem List    Diagnosis Date Noted     Allergic rhinitis 05/29/2020     Priority: Medium     Benign neoplasm of colon 05/29/2020     Priority: Medium     Depressive disorder 05/29/2020     Priority: Medium     Elevated blood-pressure reading without diagnosis of hypertension 05/29/2020     Priority: Medium     Essential hypertension 05/29/2020     Priority: Medium     Gastroesophageal reflux disease 05/29/2020     Priority: Medium     Hearing loss 05/29/2020     Priority: Medium     Herpes simplex without complication 05/29/2020     Priority: Medium     Obstructive sleep apnea syndrome 05/29/2020     Priority: Medium     Osteoarthritis 05/29/2020     Priority: Medium     Presbyopia 05/29/2020     Priority: Medium     Restless legs " 2020     Priority: Medium     Pulmonary emboli (H) 2019     Priority: Medium     Ulceration of transverse colon 2019     Priority: Medium     H/O adenomatous polyp of colon 2019     Priority: Medium     Environmental allergies 2012     Priority: Medium     Obesity 2012     Priority: Medium     Chronic hepatitis B virus infection (H) 2012     Priority: Medium        Past Medical History:    No past medical history on file.    Past Surgical History:    Past Surgical History:   Procedure Laterality Date     COLONOSCOPY N/A 2019    F/U  serrated adenoma       Family History:    Family History   Problem Relation Age of Onset     No Known Problems Other         No known history of bleeding or clotting disorders in the family       Social History:  Marital Status:  Single [1]  Social History     Tobacco Use     Smoking status: Former Smoker     Packs/day: 0.00     Last attempt to quit: 1996     Years since quittin.7     Smokeless tobacco: Former User   Substance Use Topics     Alcohol use: Yes     Alcohol/week: 1.0 standard drinks     Types: 1 Cans of beer per week     Comment:  occasional - 4 drink per week     Drug use: No        Medications:    DULoxetine (CYMBALTA) 60 MG capsule  gabapentin (NEURONTIN) 400 MG capsule  gabapentin (NEURONTIN) 400 MG capsule  loratadine (CLARITIN) 10 MG tablet  metoprolol tartrate (LOPRESSOR) 25 MG tablet  pantoprazole (PROTONIX) 40 MG EC tablet  rOPINIRole (REQUIP) 1 MG tablet  ROPINIROLE HCL PO  Simethicone 125 MG CAPS  artificial tears OINT ophthalmic ointment  carboxymethylcellulose (CELLUVISC/REFRESH LIQUIGEL) 1 % ophthalmic solution  cyclobenzaprine (FLEXERIL) 10 MG tablet  fluticasone (FLONASE) 50 MCG/ACT spray  hypromellose (GENTEAL) 0.3 % SOLN ophthalmic solution  magnesium oxide 400 MG CAPS  NONFORMULARY  polyethylene glycol (MIRALAX/GLYCOLAX) powder          Review of Systems   Constitutional: Negative for chills and  fever.   HENT: Negative for congestion.         Posterior head soreness   Eyes: Negative for visual disturbance.   Respiratory: Negative for chest tightness and shortness of breath.    Cardiovascular: Negative for chest pain.   Gastrointestinal: Negative for abdominal pain.   Genitourinary: Negative for hematuria.   Musculoskeletal: Negative for back pain.   Skin: Negative for rash and wound.   Neurological: Positive for headaches. Negative for syncope.   Hematological: Negative for adenopathy. Does not bruise/bleed easily.   Psychiatric/Behavioral: Negative for confusion.       Physical Exam   BP: (!) 161/87  Pulse: 101  Temp: 98  F (36.7  C)  Resp: 16  Weight: 100.2 kg (221 lb)  SpO2: 96 %      Physical Exam  Constitutional:       General: He is not in acute distress.     Appearance: He is well-developed. He is not diaphoretic.   HENT:      Head:      Comments: Small hematoma to posterior head  Eyes:      General: No scleral icterus.     Extraocular Movements: Extraocular movements intact.      Conjunctiva/sclera: Conjunctivae normal.      Pupils: Pupils are equal, round, and reactive to light.   Neck:      Musculoskeletal: Neck supple.   Cardiovascular:      Rate and Rhythm: Normal rate and regular rhythm.   Pulmonary:      Effort: Pulmonary effort is normal.      Breath sounds: Normal breath sounds.   Abdominal:      Palpations: Abdomen is soft.      Tenderness: There is no abdominal tenderness.   Musculoskeletal: Normal range of motion.         General: No deformity.   Lymphadenopathy:      Cervical: No cervical adenopathy.   Skin:     General: Skin is warm and dry.      Findings: No rash.   Neurological:      General: No focal deficit present.      Mental Status: He is alert and oriented to person, place, and time. Mental status is at baseline.      Cranial Nerves: No cranial nerve deficit.      Sensory: No sensory deficit.      Motor: No weakness.      Coordination: Coordination normal.   Psychiatric:          Mood and Affect: Mood normal.         Behavior: Behavior normal.         Thought Content: Thought content normal.         Judgment: Judgment normal.         ED Course        Procedures               Critical Care time:  none               Results for orders placed or performed during the hospital encounter of 06/23/20 (from the past 24 hour(s))   CT Head w/o Contrast    Narrative    PROCEDURE: CT HEAD W/O CONTRAST     HISTORY: fall off ladder, hematoma to posterior head.    COMPARISON: None.    TECHNIQUE:  Helical images of the head from the foramen magnum to the  vertex were obtained without contrast.    FINDINGS: The ventricles and sulci are normal in volume. No acute  intracranial hemorrhage, mass effect, midline shift, hydrocephalus or  basilar cystern effacement are present.    The grey-white matter interface is preserved.    The calvarium is intact. The mastoid air cells are clear.  The  visualized paranasal sinuses are clear.      Impression    IMPRESSION: No acute brain abnormality.      JULIO MORELOS MD   CT Cervical Spine w/o Contrast    Narrative    PROCEDURE: CT CERVICAL SPINE W/O CONTRAST 6/23/2020 4:54 PM    HISTORY: fall off ladder, fell backward, hit head    COMPARISONS: None.    Meds/Dose Given:    TECHNIQUE: CT scan of the cervical spine with sagittal coronal  reconstructions    FINDINGS: There are degenerative changes of the lateral atlantoaxial  joint. There is a sports subluxation of C3 on C4 due to facet joint  degenerative changes. There is reversal of cervical cord on the  curvature possibly due to muscle spasm. There is decrease in height in  the C5-C6 and C6-C7 discs. Cervical facet joint degenerative changes  are noted bilaterally worse on the right than the left there are no  fractures of the vertebral bodies or arches. Atherosclerotic plaque is  seen at the carotid bifurcations         Impression    IMPRESSION: Degenerative changes of the cervical spine no acute  fracture    JULIO  MD TAMY       Medications - No data to display    Assessments & Plan (with Medical Decision Making)   Patient is nontoxic in no acute distress.  Heart, lung, bowel sounds normal.  Abdomen soft nontender palpation.  Vital signs are stable he is afebrile.    Cranial nerves II through XII intact.  He has normal finger-to-nose and heel-to-shin testing.  He has no extremity weakness, no other signs of neurological deficit.  He has a small hematoma to his posterior head.  He does report    Numerous episodes of similar tunnel vision events over the last year.  Has had multiple work-ups but no significant findings, the studies are reviewed by me.  I do offer him further lab work, EKGs but he declines today.    He did agree to imaging of his head and neck today which showed no acute findings.    Overall, he appears to be doing very well.  I am encouraged by the well appearance the patient as well as a stable vital signs and reassuring diagnostic studies.  Is unclear what is causing his episodes of tunnel vision in the seems very difficult to diagnose over the last year for the patient.  He would like to go home at this time, he will continue to follow-up with his PCP and return the ED if there are worsening or concerning symptoms.  Understands and agrees with plan the patient is discharged.    Damien Mcnamara PA-C        I have reviewed the nursing notes.    I have reviewed the findings, diagnosis, plan and need for follow up with the patient.       Discharge Medication List as of 6/23/2020  5:23 PM          Final diagnoses:   Fall   Traumatic hematoma of head       6/23/2020   [facility]     Damien Mcnamara PA  06/24/20 0126

## 2020-07-01 PROBLEM — R07.89 OTHER CHEST PAIN: Status: ACTIVE | Noted: 2020-07-01

## 2020-07-01 PROBLEM — Z86.711 HISTORY OF PULMONARY EMBOLISM: Status: ACTIVE | Noted: 2020-07-01

## 2020-07-01 PROBLEM — Z87.891 HISTORY OF TOBACCO ABUSE: Status: ACTIVE | Noted: 2020-07-01

## 2020-07-01 PROBLEM — E66.01 MORBID OBESITY (H): Status: ACTIVE | Noted: 2020-07-01

## 2020-07-01 PROBLEM — Z79.01 CHRONIC ANTICOAGULATION: Status: ACTIVE | Noted: 2020-07-01

## 2020-07-01 PROBLEM — R94.39 ABNORMAL STRESS TEST: Status: ACTIVE | Noted: 2020-07-01

## 2020-07-01 NOTE — PROGRESS NOTES
[facility] HEART CARE   CARDIOLOGY CONSULT     Delvin Gray   1955  [id number]    Jen Galvan     Chief Complaint   Patient presents with     Consult For     atypical chest pain and HTN          HPI:   Mr. Gray is a 64-year-old gentleman who is being seen by for chest discomfort, irregular heart rhythm, and shortness of breath with history of PE.     He reports [facility] [place] [place] [facility] for chest pain.  It also sounds like he had a ZIO Patch as well as stress test at that time which he describes as being normal.  It sounds like he has had stuttering chest discomfort since that time.  If I understand him right, his chest discomfort has accelerated since.  Sounds like his symptoms really increased after being diagnosed with a PE on 12/9/2019.  It sounds like he has had his chest discomfort since.  His symptoms are somewhat atypical.  He describes random chest pressure that is not necessarily activity driven.  His discomfort radiates into his neck, shoulder, and jaw.  He discomfort comes and goes.  There is not one thing that will trigger his symptoms.  If he discontinues his activity, his chest discomfort will go away.  With that he admits to shortness of breath and dizziness.  Denies nausea, vomiting or diaphoresis.  Says he has a history of SOPHIA, hypertension which is been uncontrolled, tobacco abuse smoking 2 packs a day quitting in 1996, and family history of heart disease.  He states his father he believes had heart disease but he may have been his adopted father and his mom had CHF.  He does not have diabetes or hyperlipidemia.  He had a stress test in approximately 2010 in [place] which he tells me today was normal.    He also complains of an irregular heart rate with palpitations.  He checks his heart rate on his phone.  He checked in the room.  His heart rate was rather sporadic appearing to be largely artifact.  He reports having had a  ZIO Patch in the past and was told it was normal.  I do not have these records to confirm.    He had a stress test on 5/14/2019 that showed a small reversible defect to the apex potentially the result of ischemia versus chest wall attenuation.    Echo on 6/18/2020 showed an EF 55 to 60%.  Normal diastolic function.  No other significant abnormalities identified.    IMAGING RESULTS:   Echo on 6/18/2020:  Left ventricular function, chamber size, wall motion, and wall thickness are  normal.The EF is 55-60%.  Right ventricular function, chamber size, wall motion, and thickness are  normal.  IVC diameter <2.1 cm collapsing >50% with sniff suggests a normal RA pressure  of 3 mmHg.  No pericardial effusion is present.    Stress test on 5/14/2019:  Small reversible perfusion defect of the cardiac apex.  Differential would include ischemia versus chest wall attenuation  during the stress phase of the exam.    CURRENT MEDICATIONS:   Prior to Admission medications    Medication Sig Start Date End Date Taking? Authorizing Provider   artificial tears OINT ophthalmic ointment Place into both eyes nightly as needed     Reported, Patient   carboxymethylcellulose (CELLUVISC/REFRESH LIQUIGEL) 1 % ophthalmic solution Place 1 drop into both eyes 3 times daily as needed     Reported, Patient   cyclobenzaprine (FLEXERIL) 10 MG tablet Take 10 mg by mouth 2 times daily as needed for muscle spasms    Reported, Patient   DULoxetine (CYMBALTA) 60 MG capsule Take 60 mg by mouth daily    Reported, Patient   fluticasone (FLONASE) 50 MCG/ACT spray Spray 2 sprays into both nostrils daily     Reported, Patient   gabapentin (NEURONTIN) 400 MG capsule Take 1,200 mg by mouth At Bedtime    Unknown, Entered By History   gabapentin (NEURONTIN) 400 MG capsule Take 400-1,200 mg by mouth 2 times daily as needed    Unknown, Entered By History   hypromellose (GENTEAL) 0.3 % SOLN ophthalmic solution Place 1 drop into both eyes every hour as needed for dry eyes      Reported, Patient   loratadine (CLARITIN) 10 MG tablet Take 10 mg by mouth daily 8/30/12   Reported, Patient   magnesium oxide 400 MG CAPS Take 1 capsule by mouth daily    Unknown, Entered By History   metoprolol tartrate (LOPRESSOR) 25 MG tablet Take 12.5 mg by mouth 2 times daily    Reported, Patient   NONFORMULARY Take 3 capsules by mouth daily Heal and soothe  Systemic Multi-Enzyme Therapy (SMET)    Unknown, Entered By History   pantoprazole (PROTONIX) 40 MG EC tablet Take 40 mg by mouth daily    Reported, Patient   polyethylene glycol (MIRALAX/GLYCOLAX) powder Take 17 g by mouth daily as needed for constipation 8/30/12   Reported, Patient   rOPINIRole (REQUIP) 1 MG tablet Take 1 mg by mouth daily as needed     Reported, Patient   ROPINIROLE HCL PO Take 3 mg by mouth At Bedtime     Reported, Patient   Simethicone 125 MG CAPS Take 1 capsule by mouth 4 times daily (with meals and nightly) 4/8/20   Daryn Palumbo PA-C       ALLERGIES:   Allergies   Allergen Reactions     Seasonal Allergies      Other reaction(s): Other - Describe In Comment Field  SEASONAL, Reaction: runny nose and watery eyes        PAST MEDICAL HISTORY:   History reviewed. No pertinent past medical history.     PAST SURGICAL HISTORY:   Past Surgical History:   Procedure Laterality Date     COLONOSCOPY N/A 9/23/2019    F/U 2024 serrated adenoma        FAMILY HISTORY:   Family History   Problem Relation Age of Onset     No Known Problems Other         No known history of bleeding or clotting disorders in the family        SOCIAL HISTORY:   Social History     Socioeconomic History     Marital status: Single     Spouse name: Not on file     Number of children: Not on file     Years of education: Not on file     Highest education level: Not on file   Occupational History     Not on file   Social Needs     Financial resource strain: Not on file     Food insecurity     Worry: Not on file     Inability: Not on file     Transportation needs      Medical: Not on file     Non-medical: Not on file   Tobacco Use     Smoking status: Former Smoker     Packs/day: 0.00     Last attempt to quit: 1996     Years since quittin.7     Smokeless tobacco: Former User   Substance and Sexual Activity     Alcohol use: Yes     Alcohol/week: 1.0 standard drinks     Types: 1 Cans of beer per week     Comment:  occasional - 4 drink per week     Drug use: No     Sexual activity: Not on file   Lifestyle     Physical activity     Days per week: Not on file     Minutes per session: Not on file     Stress: Not on file   Relationships     Social connections     Talks on phone: Not on file     Gets together: Not on file     Attends Yarsani service: Not on file     Active member of club or organization: Not on file     Attends meetings of clubs or organizations: Not on file     Relationship status: Not on file     Intimate partner violence     Fear of current or ex partner: Not on file     Emotionally abused: Not on file     Physically abused: Not on file     Forced sexual activity: Not on file   Other Topics Concern     Parent/sibling w/ CABG, MI or angioplasty before 65F 55M? Not Asked   Social History Narrative    [facility], works at a local [employer], lives independently.          ROS:   CONSTITUTIONAL: No weight loss, fever, chills, weakness or fatigue.   HEENT: Eyes: No visual changes. Ears, Nose, Throat: No hearing loss, congestion or difficulty swallowing.   CARDIOVASCULAR: (+) chest pain with chest pressure and chest discomfort.  Occasional palpitations but no lower extremity edema.   RESPIRATORY: (+) shortness of breath, dyspnea upon exertion, no cough or sputum production.   GASTROINTESTINAL: No abdominal pain. No anorexia, nausea, vomiting or diarrhea.   NEUROLOGICAL: No headache, lightheadedness, dizziness, syncope, ataxia or weakness.   HEMATOLOGIC: No anemia, bleeding or bruising.   PSYCHIATRIC: No history of depression or anxiety.    ENDOCRINOLOGIC: No reports of sweating, cold or heat intolerance. No polyuria or polydipsia.   SKIN: No abnormal rashes or itching.       PHYSICAL EXAM:   GENERAL: The patient is a well-developed, well-nourished, in no apparent distress. Alert and oriented x3.   HEENT: Head is normocephalic and atraumatic. Eyes are symmetrical with normal visual tracking.  HEART: Regular rate and rhythm, S1S2 present without murmur, rub or gallop.   LUNGS: Respirations regular and unlabored. Clear to auscultation.   GI: Abdomen is soft and nondistended.   EXTREMITIES: Scant peripheral edema present.   MUSCULOSKELETAL: No joint swelling.   NEUROLOGIC: Alert and oriented X3.    SKIN: No jaundice. No rashes or visible skin lesions present.        LAB RESULTS:   No visits with results within 2 Month(s) from this visit.   Latest known visit with results is:   Ambulatory - [facility] on 03/15/2012   Component Date Value Ref Range Status     Creatinine - [facility] Historical 08/17/2012 1.05  mg/dL Final          ASSESSMENT:       ICD-10-CM    1. Other chest pain  R07.89 EKG 12-lead, tracing only     Echo Stress Echocardiogram     V/Q Scan     metoprolol tartrate (LOPRESSOR) 25 MG tablet   2. Abnormal stress test on 5/14/2019  R94.39 EKG 12-lead, tracing only     Echo Stress Echocardiogram   3. Morbid obesity (H)  E66.01 EKG 12-lead, tracing only   4. History of pulmonary embolism  Z86.711 EKG 12-lead, tracing only     V/Q Scan   5. Chronic anticoagulation  Z79.01    6. Essential hypertension  I10 losartan (COZAAR) 100 MG tablet     metoprolol tartrate (LOPRESSOR) 25 MG tablet   7. Obstructive sleep apnea syndrome  G47.33    8. History of tobacco abuse on 9/23/1996  Z87.891    9. Chronic hepatitis B virus infection (H)  B18.1    10. History of tobacco abuse quitting in 1996 at 2 packs a day.  Z87.891    11. Irregular heart rate  I49.9 Zio Patch Holter Adult Pediatric Greater than 48 hrs     metoprolol tartrate (LOPRESSOR) 25 MG tablet         PLAN:    1.  Secondary to sporadic chest pain with somewhat atypical symptoms as described above and potentially abnormal stress test on 5/14/2019 with risk factors, will be set up for an exercise stress echo.  Patient states he would be able to walk on a treadmill.  2.  Will be set up for a V/Q scan.  He states his chest discomfort accelerated when he was diagnosed with a PE on 12/9/2019.  It is possible that the blood clot was not completely broken down and is causing his ongoing chest discomfort.  3.  Secondary to irregular heart beats identified on his phone through an appetite checks heart rhythm and occasional palpitations, he will be set up for a ZIO Patch.  He reports having [facility] Z[facility] and nothing was identified of significant concern.  4.  Secondary to uncontrolled hypertension, will increase losartan 50 mg to 100 mg daily.  5.  Secondary to hypertension, elevated normal heart rates, and irregular heart rate, will increase metoprolol from 12.5 mg twice a day to 25 mg twice a day.  6.  He was told to check his blood pressure on a daily basis at home.  He does have blood pressure, monitor.  He should call us in approximately 2 weeks with his pressures.  He was to bring a log with him to his next visit.  7.  Follow-up in 6 to 8 weeks after completion of testing.    Thank you for allowing me to participate in the care of your patient. Please do not hesitate to contact me if you have any questions.     Bharath Zuniga, DO

## 2020-07-02 ENCOUNTER — OFFICE VISIT (OUTPATIENT)
Dept: CARDIOLOGY | Facility: OTHER | Age: 65
End: 2020-07-02
Attending: PHYSICIAN ASSISTANT
Payer: COMMERCIAL

## 2020-07-02 ENCOUNTER — HOSPITAL ENCOUNTER (OUTPATIENT)
Dept: ULTRASOUND IMAGING | Facility: OTHER | Age: 65
Discharge: HOME | End: 2020-07-02
Attending: NURSE PRACTITIONER | Admitting: NURSE PRACTITIONER
Payer: COMMERCIAL

## 2020-07-02 VITALS
SYSTOLIC BLOOD PRESSURE: 148 MMHG | WEIGHT: 220 LBS | OXYGEN SATURATION: 95 % | BODY MASS INDEX: 36.65 KG/M2 | DIASTOLIC BLOOD PRESSURE: 82 MMHG | HEIGHT: 65 IN | RESPIRATION RATE: 18 BRPM | TEMPERATURE: 97.2 F | HEART RATE: 102 BPM

## 2020-07-02 DIAGNOSIS — R07.89 OTHER CHEST PAIN: Primary | ICD-10-CM

## 2020-07-02 DIAGNOSIS — B18.1 CHRONIC HEPATITIS B VIRUS INFECTION (H): ICD-10-CM

## 2020-07-02 DIAGNOSIS — Z87.891 HISTORY OF TOBACCO ABUSE: ICD-10-CM

## 2020-07-02 DIAGNOSIS — R41.0 DISORIENTATION: ICD-10-CM

## 2020-07-02 DIAGNOSIS — R94.39 ABNORMAL STRESS TEST: ICD-10-CM

## 2020-07-02 DIAGNOSIS — G47.33 OBSTRUCTIVE SLEEP APNEA SYNDROME: ICD-10-CM

## 2020-07-02 DIAGNOSIS — Z79.01 CHRONIC ANTICOAGULATION: ICD-10-CM

## 2020-07-02 DIAGNOSIS — I10 ESSENTIAL HYPERTENSION: ICD-10-CM

## 2020-07-02 DIAGNOSIS — R42 DIZZINESS: ICD-10-CM

## 2020-07-02 DIAGNOSIS — E66.01 MORBID OBESITY (H): ICD-10-CM

## 2020-07-02 DIAGNOSIS — I49.9 IRREGULAR HEART RATE: ICD-10-CM

## 2020-07-02 DIAGNOSIS — Z86.711 HISTORY OF PULMONARY EMBOLISM: ICD-10-CM

## 2020-07-02 LAB — INTERPRETATION ECG - MUSE: NORMAL

## 2020-07-02 PROCEDURE — 93000 ELECTROCARDIOGRAM COMPLETE: CPT | Performed by: INTERNAL MEDICINE

## 2020-07-02 PROCEDURE — 99203 OFFICE O/P NEW LOW 30 MIN: CPT | Performed by: INTERNAL MEDICINE

## 2020-07-02 PROCEDURE — 93880 EXTRACRANIAL BILAT STUDY: CPT

## 2020-07-02 RX ORDER — METOPROLOL TARTRATE 25 MG/1
25 TABLET, FILM COATED ORAL 2 TIMES DAILY
Qty: 180 TABLET | Refills: 3 | Status: SHIPPED | OUTPATIENT
Start: 2020-07-02 | End: 2020-07-20

## 2020-07-02 RX ORDER — LOSARTAN POTASSIUM 100 MG/1
100 TABLET ORAL DAILY
Qty: 90 TABLET | Refills: 3 | Status: SHIPPED | OUTPATIENT
Start: 2020-07-02 | End: 2024-03-21

## 2020-07-02 ASSESSMENT — PAIN SCALES - GENERAL: PAINLEVEL: NO PAIN (0)

## 2020-07-02 ASSESSMENT — MIFFLIN-ST. JEOR: SCORE: 1717.91

## 2020-07-02 NOTE — NURSING NOTE
"Patient comes in for atypical chest pain and HTN.  Lilly Poole LPN ....................7/2/2020   8:36 AM  Chief Complaint   Patient presents with     Consult For     atypical chest pain and HTN       Initial BP (!) 148/82 (BP Location: Right arm, Patient Position: Sitting, Cuff Size: Adult Large)   Pulse 102   Temp 97.2  F (36.2  C) (Tympanic)   Resp 18   Ht 1.64 m (5' 4.57\")   Wt 99.8 kg (220 lb)   SpO2 95%   BMI 37.10 kg/m   Estimated body mass index is 37.1 kg/m  as calculated from the following:    Height as of this encounter: 1.64 m (5' 4.57\").    Weight as of this encounter: 99.8 kg (220 lb).  Meds Reconciled: complete  Pt is not on Aspirin  Pt is not on a Statin  PHQ and/or EMILIA reviewed. Pt referred to PCP/MH Provider as appropriate.    Lilly Poole LPN      "

## 2020-07-02 NOTE — PATIENT INSTRUCTIONS
You were seen by  Bharath Zuniga,        1. A Exercise stress echo has been ordered. You will be called to schedule this. You will receive instructions for testing at that time. You will be contacted with results.       2. A Zio patch heart monitor has been ordered to wear for 14 days. You will be called to schedule this. You will receive instructions for testing at that time. You will be contacted with results.      3. A VQ scan has been ordered. You will be called to schedule this. You will receive instructions for testing at that time. You will be contacted with results.      4. Increase Losartan from 50mg to 100mg take by mouth daily.    5. Increase Metoprolol from 12.5mg to 25mg take by mouth twice daily.    6. Check blood pressure once daily.  Write down reading and call clinic in 2 weeks ask for Ashley in cardiology in unit 3. ([phone]).    [facility]    8.  No other changes at this time.    You will follow up with [facility] in 6-8 weeks, sooner if needed.       Please call the cardiology office with problems, questions, or concerns at [phone].    If you experience chest pain, chest pressure, chest tightness, shortness of breath, fainting, lightheadedness, nausea, vomiting, or other concerning symptoms, please report to the Emergency Department or call 911. These symptoms may be emergent, and best treated in the Emergency Department.     Cardiology Nurses  Ashley Garcia, SANDEEP TOLLIVER, HERMINIA BAILEY, HERMINIA  [facility] (Unit [address]C)  [phone]

## 2020-07-10 ENCOUNTER — TELEPHONE (OUTPATIENT)
Dept: CARDIOLOGY | Facility: OTHER | Age: 65
End: 2020-07-10

## 2020-07-13 ENCOUNTER — HOSPITAL ENCOUNTER (OUTPATIENT)
Dept: CARDIOLOGY | Facility: OTHER | Age: 65
Discharge: HOME | End: 2020-07-13
Attending: INTERNAL MEDICINE | Admitting: INTERNAL MEDICINE
Payer: COMMERCIAL

## 2020-07-13 VITALS — OXYGEN SATURATION: 95 % | TEMPERATURE: 98.2 F

## 2020-07-13 DIAGNOSIS — R94.39 ABNORMAL STRESS TEST: ICD-10-CM

## 2020-07-13 DIAGNOSIS — R07.89 OTHER CHEST PAIN: ICD-10-CM

## 2020-07-13 PROCEDURE — 93321 DOPPLER ECHO F-UP/LMTD STD: CPT | Mod: TC

## 2020-07-13 PROCEDURE — 93016 CV STRESS TEST SUPVJ ONLY: CPT | Performed by: INTERNAL MEDICINE

## 2020-07-13 PROCEDURE — 93325 DOPPLER ECHO COLOR FLOW MAPG: CPT | Mod: 26 | Performed by: INTERNAL MEDICINE

## 2020-07-13 PROCEDURE — 93321 DOPPLER ECHO F-UP/LMTD STD: CPT | Mod: 26 | Performed by: INTERNAL MEDICINE

## 2020-07-13 PROCEDURE — 25500064 ZZH RX 255 OP 636: Performed by: INTERNAL MEDICINE

## 2020-07-13 PROCEDURE — 93018 CV STRESS TEST I&R ONLY: CPT | Performed by: INTERNAL MEDICINE

## 2020-07-13 PROCEDURE — 93350 STRESS TTE ONLY: CPT | Mod: 26 | Performed by: INTERNAL MEDICINE

## 2020-07-13 RX ADMIN — PERFLUTREN 4 ML: 6.52 INJECTION, SUSPENSION INTRAVENOUS at 15:00

## 2020-07-13 NOTE — PROGRESS NOTES
14:00:  The patient arrived for a stress echo.  The procedure, risks and benefits were discussed and the consent was signed.  The patient was prepped for the stress test, and an IV was placed per procedure.  The echo sonographer did the initial images with definity for image enhancement.    arrived, and the patient biked 9 minutes and 20seconds.  The patient tolerated the procedure.  Stress images were completed and the IV was removed per procedure.  The patient was released in stable condition.  The patient was instructed that the ordering MD will call with results in one to two days.  Please see the chart for complete test results.     Detail Level: Detailed

## 2020-07-16 ENCOUNTER — HOSPITAL ENCOUNTER (OUTPATIENT)
Dept: CARDIOLOGY | Facility: OTHER | Age: 65
Discharge: HOME | End: 2020-07-16
Attending: INTERNAL MEDICINE | Admitting: INTERNAL MEDICINE
Payer: COMMERCIAL

## 2020-07-16 DIAGNOSIS — I49.9 IRREGULAR HEART RATE: ICD-10-CM

## 2020-07-16 PROCEDURE — 0298T LEADLESS EKG MONITOR 3 TO 14 DAYS: CPT | Performed by: INTERNAL MEDICINE

## 2020-07-16 PROCEDURE — 0296T LEADLESS EKG MONITOR 3 TO 14 DAYS: CPT

## 2020-07-16 NOTE — PATIENT INSTRUCTIONS
Patient instructed not to:   -take baths, swim, sauna   -remove device prior to 14 days   -use electric blankets   -shower or sweat excessively within first 24 hours of device application  Patient instructed to:   -shower as needed   -be carefull when toweling off and dressing   -press button when cardiac symptoms occur   -document symptoms in log book   -remove and return device (send via mail to [facility])   -Call [facility] with any questions

## 2020-07-17 ENCOUNTER — HOSPITAL ENCOUNTER (OUTPATIENT)
Dept: GENERAL RADIOLOGY | Facility: OTHER | Age: 65
Discharge: HOME | End: 2020-07-17
Attending: INTERNAL MEDICINE | Admitting: INTERNAL MEDICINE
Payer: COMMERCIAL

## 2020-07-17 ENCOUNTER — HOSPITAL ENCOUNTER (OUTPATIENT)
Dept: NUCLEAR MEDICINE | Facility: OTHER | Age: 65
Setting detail: NUCLEAR MEDICINE
Discharge: HOME | End: 2020-07-17
Attending: INTERNAL MEDICINE | Admitting: INTERNAL MEDICINE
Payer: COMMERCIAL

## 2020-07-17 DIAGNOSIS — R07.89 OTHER CHEST PAIN: ICD-10-CM

## 2020-07-17 DIAGNOSIS — Z86.711 HISTORY OF PULMONARY EMBOLISM: ICD-10-CM

## 2020-07-17 PROCEDURE — A9567 TECHNETIUM TC-99M AEROSOL: HCPCS | Performed by: INTERNAL MEDICINE

## 2020-07-17 PROCEDURE — 78582 LUNG VENTILAT&PERFUS IMAGING: CPT

## 2020-07-17 PROCEDURE — A9540 TC99M MAA: HCPCS | Performed by: INTERNAL MEDICINE

## 2020-07-17 PROCEDURE — 34300033 ZZH RX 343: Performed by: INTERNAL MEDICINE

## 2020-07-17 PROCEDURE — 71046 X-RAY EXAM CHEST 2 VIEWS: CPT

## 2020-07-17 RX ADMIN — KIT FOR THE PREPARATION OF TECHNETIUM TC 99M ALBUMIN AGGREGATED 5.28 MILLICURIE: 2.5 INJECTION, POWDER, FOR SOLUTION INTRAVENOUS at 12:45

## 2020-07-17 RX ADMIN — KIT FOR THE PREPARATION OF TECHNETIUM TC 99M PENTETATE 31.1 MILLICURIE: 20 INJECTION, POWDER, LYOPHILIZED, FOR SOLUTION INTRAVENOUS; RESPIRATORY (INHALATION) at 12:20

## 2020-08-31 ENCOUNTER — TELEPHONE (OUTPATIENT)
Dept: RESPIRATORY THERAPY | Facility: OTHER | Age: 65
End: 2020-08-31

## 2020-11-08 ENCOUNTER — HEALTH MAINTENANCE LETTER (OUTPATIENT)
Age: 65
End: 2020-11-08

## 2021-09-11 ENCOUNTER — HEALTH MAINTENANCE LETTER (OUTPATIENT)
Age: 66
End: 2021-09-11

## 2021-11-07 ENCOUNTER — APPOINTMENT (OUTPATIENT)
Dept: CT IMAGING | Facility: OTHER | Age: 66
End: 2021-11-07
Attending: PHYSICIAN ASSISTANT
Payer: COMMERCIAL

## 2021-11-07 ENCOUNTER — HOSPITAL ENCOUNTER (EMERGENCY)
Facility: OTHER | Age: 66
Discharge: HOME | End: 2021-11-07
Attending: PHYSICIAN ASSISTANT | Admitting: PHYSICIAN ASSISTANT
Payer: COMMERCIAL

## 2021-11-07 VITALS
DIASTOLIC BLOOD PRESSURE: 103 MMHG | SYSTOLIC BLOOD PRESSURE: 145 MMHG | HEART RATE: 106 BPM | OXYGEN SATURATION: 100 % | BODY MASS INDEX: 35.47 KG/M2 | RESPIRATION RATE: 18 BRPM | WEIGHT: 210.3 LBS | TEMPERATURE: 101.2 F

## 2021-11-07 DIAGNOSIS — U07.1 PNEUMONIA DUE TO 2019 NOVEL CORONAVIRUS: ICD-10-CM

## 2021-11-07 DIAGNOSIS — J12.82 PNEUMONIA DUE TO 2019 NOVEL CORONAVIRUS: ICD-10-CM

## 2021-11-07 DIAGNOSIS — R07.9 CHEST PAIN: ICD-10-CM

## 2021-11-07 LAB
ALBUMIN SERPL-MCNC: 3.7 G/DL (ref 3.5–5.7)
ALP SERPL-CCNC: 63 U/L (ref 34–104)
ALT SERPL W P-5'-P-CCNC: 29 U/L (ref 7–52)
ANION GAP SERPL CALCULATED.3IONS-SCNC: 6 MMOL/L (ref 3–14)
AST SERPL W P-5'-P-CCNC: 26 U/L (ref 13–39)
BASOPHILS # BLD AUTO: 0 10E3/UL (ref 0–0.2)
BASOPHILS NFR BLD AUTO: 0 %
BILIRUB SERPL-MCNC: 0.5 MG/DL (ref 0.3–1)
BUN SERPL-MCNC: 23 MG/DL (ref 7–25)
CALCIUM SERPL-MCNC: 9.1 MG/DL (ref 8.6–10.3)
CHLORIDE BLD-SCNC: 101 MMOL/L (ref 98–107)
CO2 SERPL-SCNC: 28 MMOL/L (ref 21–31)
CREAT SERPL-MCNC: 1.17 MG/DL (ref 0.7–1.3)
EOSINOPHIL # BLD AUTO: 0 10E3/UL (ref 0–0.7)
EOSINOPHIL NFR BLD AUTO: 0 %
ERYTHROCYTE [DISTWIDTH] IN BLOOD BY AUTOMATED COUNT: 12.9 % (ref 10–15)
GFR SERPL CREATININE-BSD FRML MDRD: 65 ML/MIN/1.73M2
GLUCOSE BLD-MCNC: 112 MG/DL (ref 70–105)
HCT VFR BLD AUTO: 42.1 % (ref 40–53)
HGB BLD-MCNC: 14.3 G/DL (ref 13.3–17.7)
IMM GRANULOCYTES # BLD: 0 10E3/UL
IMM GRANULOCYTES NFR BLD: 0 %
LYMPHOCYTES # BLD AUTO: 0.7 10E3/UL (ref 0.8–5.3)
LYMPHOCYTES NFR BLD AUTO: 28 %
MAGNESIUM SERPL-MCNC: 1.8 MG/DL (ref 1.9–2.7)
MCH RBC QN AUTO: 30.2 PG (ref 26.5–33)
MCHC RBC AUTO-ENTMCNC: 34 G/DL (ref 31.5–36.5)
MCV RBC AUTO: 89 FL (ref 78–100)
MONOCYTES # BLD AUTO: 0.3 10E3/UL (ref 0–1.3)
MONOCYTES NFR BLD AUTO: 13 %
NEUTROPHILS # BLD AUTO: 1.4 10E3/UL (ref 1.6–8.3)
NEUTROPHILS NFR BLD AUTO: 59 %
NRBC # BLD AUTO: 0 10E3/UL
NRBC BLD AUTO-RTO: 0 /100
NT-PROBNP SERPL-MCNC: 19 PG/ML (ref 0–100)
PLATELET # BLD AUTO: 124 10E3/UL (ref 150–450)
POTASSIUM BLD-SCNC: 3.8 MMOL/L (ref 3.5–5.1)
PROT SERPL-MCNC: 6.5 G/DL (ref 6.4–8.9)
RBC # BLD AUTO: 4.73 10E6/UL (ref 4.4–5.9)
SODIUM SERPL-SCNC: 135 MMOL/L (ref 134–144)
TROPONIN I SERPL-MCNC: 5.5 PG/ML (ref 0–34)
TROPONIN I SERPL-MCNC: 5.9 PG/ML (ref 0–34)
WBC # BLD AUTO: 2.4 10E3/UL (ref 4–11)

## 2021-11-07 PROCEDURE — 93010 ELECTROCARDIOGRAM REPORT: CPT | Performed by: INTERNAL MEDICINE

## 2021-11-07 PROCEDURE — 250N000011 HC RX IP 250 OP 636: Performed by: PHYSICIAN ASSISTANT

## 2021-11-07 PROCEDURE — 93005 ELECTROCARDIOGRAM TRACING: CPT | Performed by: PHYSICIAN ASSISTANT

## 2021-11-07 PROCEDURE — 99283 EMERGENCY DEPT VISIT LOW MDM: CPT | Performed by: PHYSICIAN ASSISTANT

## 2021-11-07 PROCEDURE — 99285 EMERGENCY DEPT VISIT HI MDM: CPT | Mod: 25 | Performed by: PHYSICIAN ASSISTANT

## 2021-11-07 PROCEDURE — 83880 ASSAY OF NATRIURETIC PEPTIDE: CPT | Performed by: PHYSICIAN ASSISTANT

## 2021-11-07 PROCEDURE — 36415 COLL VENOUS BLD VENIPUNCTURE: CPT | Performed by: PHYSICIAN ASSISTANT

## 2021-11-07 PROCEDURE — 71275 CT ANGIOGRAPHY CHEST: CPT

## 2021-11-07 PROCEDURE — 84484 ASSAY OF TROPONIN QUANT: CPT | Mod: 91 | Performed by: PHYSICIAN ASSISTANT

## 2021-11-07 PROCEDURE — 83735 ASSAY OF MAGNESIUM: CPT | Performed by: PHYSICIAN ASSISTANT

## 2021-11-07 PROCEDURE — 80053 COMPREHEN METABOLIC PANEL: CPT | Performed by: PHYSICIAN ASSISTANT

## 2021-11-07 PROCEDURE — 250N000013 HC RX MED GY IP 250 OP 250 PS 637: Performed by: PHYSICIAN ASSISTANT

## 2021-11-07 PROCEDURE — 85025 COMPLETE CBC W/AUTO DIFF WBC: CPT | Performed by: PHYSICIAN ASSISTANT

## 2021-11-07 RX ORDER — IOPAMIDOL 755 MG/ML
86 INJECTION, SOLUTION INTRAVASCULAR ONCE
Status: COMPLETED | OUTPATIENT
Start: 2021-11-07 | End: 2021-11-07

## 2021-11-07 RX ORDER — ACETAMINOPHEN 500 MG
500 TABLET ORAL ONCE
Status: COMPLETED | OUTPATIENT
Start: 2021-11-07 | End: 2021-11-07

## 2021-11-07 RX ORDER — ASPIRIN 81 MG/1
324 TABLET, CHEWABLE ORAL ONCE
Status: DISCONTINUED | OUTPATIENT
Start: 2021-11-07 | End: 2021-11-08 | Stop reason: HOSPADM

## 2021-11-07 RX ADMIN — IOPAMIDOL 86 ML: 755 INJECTION, SOLUTION INTRAVENOUS at 20:41

## 2021-11-07 RX ADMIN — ACETAMINOPHEN 500 MG: 500 TABLET, FILM COATED ORAL at 21:58

## 2021-11-08 LAB
ATRIAL RATE - MUSE: 102 BPM
DIASTOLIC BLOOD PRESSURE - MUSE: NORMAL MMHG
INTERPRETATION ECG - MUSE: NORMAL
P AXIS - MUSE: 40 DEGREES
PR INTERVAL - MUSE: 130 MS
QRS DURATION - MUSE: 90 MS
QT - MUSE: 328 MS
QTC - MUSE: 427 MS
R AXIS - MUSE: 40 DEGREES
SYSTOLIC BLOOD PRESSURE - MUSE: NORMAL MMHG
T AXIS - MUSE: 15 DEGREES
VENTRICULAR RATE- MUSE: 102 BPM

## 2021-11-08 ASSESSMENT — ENCOUNTER SYMPTOMS
FEVER: 0
BRUISES/BLEEDS EASILY: 0
WOUND: 0
CONFUSION: 0
CHILLS: 0
BACK PAIN: 0
ABDOMINAL PAIN: 0
CHEST TIGHTNESS: 0
SHORTNESS OF BREATH: 0
HEMATURIA: 0

## 2021-11-08 NOTE — ED TRIAGE NOTES
Patient comes to ER from home with c/o 1 out of 10 mid-sternal chest pain that came on last evening. States the pain comes and goes. Non-radiating. C/o mild SOB. Satting 95% via RA. Has hx of PEs. Has temp of 101.8. Tested positive for covid 1 week ago.

## 2021-11-08 NOTE — ED PROVIDER NOTES
History     Chief Complaint   Patient presents with     Covid Concern     Chest Pain     HPI  Delvin Gray is a 66 year old male who presents to the ED for a covid concern/chest pain. Patient comes to ER from home with c/o 1 out of 10 mid-sternal chest pain that came on last evening. States the pain comes and goes. Non-radiating. C/o mild SOB. Satting 95% via RA. Has hx of PEs. Has temp of 101.8. Tested positive for covid 1 week ago.     Allergies:  Allergies   Allergen Reactions     Seasonal Allergies      Other reaction(s): Other - Describe In Comment Field  SEASONAL, Reaction: runny nose and watery eyes       Problem List:    Patient Active Problem List    Diagnosis Date Noted     Irregular heart rate 07/02/2020     Priority: Medium     Other chest pain 07/01/2020     Priority: Medium     Abnormal stress test on 5/14/2019 07/01/2020     Priority: Medium     Morbid obesity (H) 07/01/2020     Priority: Medium     History of pulmonary embolism on 12/9/2019 07/01/2020     Priority: Medium     Chronic anticoagulation 07/01/2020     Priority: Medium     History of tobacco abuse on 9/23/1996 07/01/2020     Priority: Medium     Allergic rhinitis 05/29/2020     Priority: Medium     Benign neoplasm of colon 05/29/2020     Priority: Medium     Depressive disorder 05/29/2020     Priority: Medium     Essential hypertension 05/29/2020     Priority: Medium     Gastroesophageal reflux disease 05/29/2020     Priority: Medium     Hearing loss 05/29/2020     Priority: Medium     Herpes simplex without complication 05/29/2020     Priority: Medium     Obstructive sleep apnea syndrome 05/29/2020     Priority: Medium     Osteoarthritis 05/29/2020     Priority: Medium     Presbyopia 05/29/2020     Priority: Medium     Restless legs 05/29/2020     Priority: Medium     Ulceration of transverse colon 09/26/2019     Priority: Medium     H/O adenomatous polyp of colon 09/19/2019     Priority: Medium     Environmental allergies  2012     Priority: Medium     Chronic hepatitis B virus infection (H) 2012     Priority: Medium        Past Medical History:    History reviewed. No pertinent past medical history.    Past Surgical History:    Past Surgical History:   Procedure Laterality Date     COLONOSCOPY N/A 2019    F/U  serrated adenoma       Family History:    Family History   Problem Relation Age of Onset     No Known Problems Other         No known history of bleeding or clotting disorders in the family       Social History:  Marital Status:  Single [1]  Social History     Tobacco Use     Smoking status: Former Smoker     Packs/day: 0.00     Quit date: 1996     Years since quittin.1     Smokeless tobacco: Former User   Substance Use Topics     Alcohol use: Yes     Alcohol/week: 1.0 standard drink     Types: 1 Cans of beer per week     Comment:  occasional - 4 drink per week     Drug use: No        Medications:    artificial tears OINT ophthalmic ointment  carboxymethylcellulose (CELLUVISC/REFRESH LIQUIGEL) 1 % ophthalmic solution  cyclobenzaprine (FLEXERIL) 10 MG tablet  DULoxetine (CYMBALTA) 60 MG capsule  fluticasone (FLONASE) 50 MCG/ACT spray  gabapentin (NEURONTIN) 400 MG capsule  gabapentin (NEURONTIN) 400 MG capsule  hypromellose (GENTEAL) 0.3 % SOLN ophthalmic solution  loratadine (CLARITIN) 10 MG tablet  losartan (COZAAR) 100 MG tablet  magnesium oxide 400 MG CAPS  metoprolol tartrate (LOPRESSOR) 25 MG tablet  NONFORMULARY  pantoprazole (PROTONIX) 40 MG EC tablet  polyethylene glycol (MIRALAX/GLYCOLAX) powder  rOPINIRole (REQUIP) 1 MG tablet  Simethicone 125 MG CAPS          Review of Systems   Constitutional: Negative for chills and fever.   HENT: Negative for congestion.    Eyes: Negative for visual disturbance.   Respiratory: Negative for chest tightness and shortness of breath.    Cardiovascular: Positive for chest pain.   Gastrointestinal: Negative for abdominal pain.   Genitourinary: Negative for  hematuria.   Musculoskeletal: Negative for back pain.   Skin: Negative for rash and wound.   Neurological: Negative for syncope.   Hematological: Does not bruise/bleed easily.   Psychiatric/Behavioral: Negative for confusion.       Physical Exam   BP: (!) 144/111  Pulse: 106  Temp: (!) 101.8  F (38.8  C)  Resp: 20  Weight: 95.4 kg (210 lb 4.8 oz)  SpO2: 95 %      Physical Exam  Constitutional:       General: He is not in acute distress.     Appearance: He is well-developed. He is not diaphoretic.   HENT:      Head: Normocephalic and atraumatic.   Eyes:      General: No scleral icterus.     Conjunctiva/sclera: Conjunctivae normal.   Cardiovascular:      Rate and Rhythm: Regular rhythm. Tachycardia present.   Pulmonary:      Effort: Pulmonary effort is normal.      Breath sounds: Normal breath sounds.   Abdominal:      Palpations: Abdomen is soft.      Tenderness: There is no abdominal tenderness.   Musculoskeletal:         General: No deformity.      Cervical back: Neck supple.   Lymphadenopathy:      Cervical: No cervical adenopathy.   Skin:     General: Skin is warm and dry.      Coloration: Skin is not jaundiced.      Findings: No rash.   Neurological:      Mental Status: He is alert and oriented to person, place, and time. Mental status is at baseline.   Psychiatric:         Mood and Affect: Mood normal.         Behavior: Behavior normal.         ED Course        Procedures         EKG read at 1822. . Sinus tachycardia, no ST changes.    Critical Care time:  none               Results for orders placed or performed during the hospital encounter of 11/07/21 (from the past 24 hour(s))   CBC with platelets differential    Narrative    The following orders were created for panel order CBC with platelets differential.  Procedure                               Abnormality         Status                     ---------                               -----------         ------                     CBC with platelets and  dSerjio..[[id number]]  Abnormal            Final result                 Please view results for these tests on the individual orders.   Troponin I   Result Value Ref Range    Troponin I 5.5 0.0 - 34.0 pg/mL   Comprehensive metabolic panel   Result Value Ref Range    Sodium 135 134 - 144 mmol/L    Potassium 3.8 3.5 - 5.1 mmol/L    Chloride 101 98 - 107 mmol/L    Carbon Dioxide (CO2) 28 21 - 31 mmol/L    Anion Gap 6 3 - 14 mmol/L    Urea Nitrogen 23 7 - 25 mg/dL    Creatinine 1.17 0.70 - 1.30 mg/dL    Calcium 9.1 8.6 - 10.3 mg/dL    Glucose 112 (H) 70 - 105 mg/dL    Alkaline Phosphatase 63 34 - 104 U/L    AST 26 13 - 39 U/L    ALT 29 7 - 52 U/L    Protein Total 6.5 6.4 - 8.9 g/dL    Albumin 3.7 3.5 - 5.7 g/dL    Bilirubin Total 0.5 0.3 - 1.0 mg/dL    GFR Estimate 65 >60 mL/min/1.73m2   Magnesium   Result Value Ref Range    Magnesium 1.8 (L) 1.9 - 2.7 mg/dL   Nt probnp inpatient (BNP)   Result Value Ref Range    N terminal Pro BNP Inpatient 19 0 - 100 pg/mL   CBC with platelets and differential   Result Value Ref Range    WBC Count 2.4 (L) 4.0 - 11.0 10e3/uL    RBC Count 4.73 4.40 - 5.90 10e6/uL    Hemoglobin 14.3 13.3 - 17.7 g/dL    Hematocrit 42.1 40.0 - 53.0 %    MCV 89 78 - 100 fL    MCH 30.2 26.5 - 33.0 pg    MCHC 34.0 31.5 - 36.5 g/dL    RDW 12.9 10.0 - 15.0 %    Platelet Count 124 (L) 150 - 450 10e3/uL    % Neutrophils 59 %    % Lymphocytes 28 %    % Monocytes 13 %    % Eosinophils 0 %    % Basophils 0 %    % Immature Granulocytes 0 %    NRBCs per 100 WBC 0 <1 /100    Absolute Neutrophils 1.4 (L) 1.6 - 8.3 10e3/uL    Absolute Lymphocytes 0.7 (L) 0.8 - 5.3 10e3/uL    Absolute Monocytes 0.3 0.0 - 1.3 10e3/uL    Absolute Eosinophils 0.0 0.0 - 0.7 10e3/uL    Absolute Basophils 0.0 0.0 - 0.2 10e3/uL    Absolute Immature Granulocytes 0.0 <=0.0 10e3/uL    Absolute NRBCs 0.0 10e3/uL   Troponin I (second draw)   Result Value Ref Range    Troponin I 5.9 0.0 - 34.0 pg/mL   CT Chest Pulmonary Embolism w Contrast    Narrative     CT CHEST PULMONARY EMBOLISM W CONTRAST    HISTORY: 66 years Male PE suspected, high prob; PE suspected,  low/intermediate prob, neg D-dimer; Chest pain, +COVID, fever, past  PE's. PE? lungs? heart?    TECHNIQUE: Axial CT imaging of the chest was performed With  intravenous contrast. Coronal and sagittal reconstructions were  obtained.    COMPARISON: 5/29/2020    FINDINGS:  There is no evidence of pulmonary embolism. There is no evidence of  thoracic aortic aneurysm or dissection.  Moderately enlarged mediastinal and bilateral hilar lymph nodes are  present.    Patchy bilateral groundglass airspace opacities are present.         No concerning osseous lesions are identified      Impression    IMPRESSION: No evidence of pulmonary embolism.    Findings suggestive of Covid pneumonia    ANIL REDMOND MD         SYSTEM ID:  [id number]       Medications   iopamidol (ISOVUE-370) solution 86 mL (86 mLs Intravenous Given 11/7/21 2041)   acetaminophen (TYLENOL) tablet 500 mg (500 mg Oral Given 11/7/21 2158)       Assessments & Plan (with Medical Decision Making)   Pt nontoxic in NAD. Heart, lung, bowel sounds normal, abd soft, nontender to palpation, nondistended. Slight tachycardia, febrile.     2 negative troponins, no significant change. Mag 1.8. remainder of lab work consistent with COVID illness.     CT PE study read as:  -No evidence of pulmonary embolism.     -Findings suggestive of Covid pneumonia    Pt given tylenol.     Upon reassessment he is feeling much better with no more chest discomfort.     We discuss our findings. It is not entirely clear what is causing his symptoms. However, I am encouraged by his well appearance and stable vital signs and reassuring dx studies. It is very possible that his sx's are due to COVID illness. We will send home on get well loop.     Strict return precautions are given to the pt, they will return if symptoms are worsening or concerning. The pt understands and agrees with the  plan and they are discharged.     Damien Mcnamara PA-C        I have reviewed the nursing notes.    I have reviewed the findings, diagnosis, plan and need for follow up with the patient.       Discharge Medication List as of 11/7/2021  9:56 PM          Final diagnoses:   Pneumonia due to 2019 novel coronavirus   Chest pain       11/7/2021   [facility]     Damien Mcnamara PA  11/08/21 0007

## 2021-11-08 NOTE — DISCHARGE INSTRUCTIONS
Get plenty of fluids and rest.  Alternate Tylenol and ibuprofen every 4 hours to help with fever and comfort control.  As we discussed all the studies looking at your heart.  Excellent today and there are no signs of blood clot in your lungs.  You do however have Covid pneumonia and have a high fever.  I think this may have something to do with the discomfort you are feeling.  You can go to the [facility] and register to receive a monoclonal antibody recommend you that tomorrow.  Return if there are worsening or concerning symptoms.  I did place referral for you to follow-up with echo cardiogram and your PCP for reassessment.  Follow the information given to you by respiratory therapy for the get well loop.

## 2022-01-01 ENCOUNTER — HEALTH MAINTENANCE LETTER (OUTPATIENT)
Age: 67
End: 2022-01-01

## 2022-08-15 ENCOUNTER — HOSPITAL ENCOUNTER (EMERGENCY)
Facility: OTHER | Age: 67
Discharge: HOME | End: 2022-08-15
Attending: EMERGENCY MEDICINE | Admitting: EMERGENCY MEDICINE
Payer: COMMERCIAL

## 2022-08-15 VITALS
WEIGHT: 210 LBS | BODY MASS INDEX: 34.99 KG/M2 | SYSTOLIC BLOOD PRESSURE: 133 MMHG | TEMPERATURE: 98.2 F | RESPIRATION RATE: 14 BRPM | DIASTOLIC BLOOD PRESSURE: 83 MMHG | OXYGEN SATURATION: 93 % | HEIGHT: 65 IN | HEART RATE: 79 BPM

## 2022-08-15 DIAGNOSIS — R07.89 ATYPICAL CHEST PAIN: ICD-10-CM

## 2022-08-15 PROBLEM — G62.9 NEUROPATHY: Status: ACTIVE | Noted: 2022-08-15

## 2022-08-15 PROBLEM — B86 INFESTATION BY SARCOPTES SCABIEI: Status: ACTIVE | Noted: 2022-08-15

## 2022-08-15 PROBLEM — H52.00 HYPERMETROPIA: Status: ACTIVE | Noted: 2022-08-15

## 2022-08-15 PROBLEM — I26.99 PULMONARY EMBOLISM (H): Status: ACTIVE | Noted: 2022-08-15

## 2022-08-15 PROBLEM — K64.9 HEMORRHOIDS: Status: ACTIVE | Noted: 2022-08-15

## 2022-08-15 PROBLEM — Z96.659 KNEE JOINT REPLACED BY OTHER MEANS: Status: ACTIVE | Noted: 2022-08-15

## 2022-08-15 PROBLEM — M20.40 HAMMER TOE: Status: ACTIVE | Noted: 2022-08-15

## 2022-08-15 PROBLEM — H53.009 AMBLYOPIA: Status: ACTIVE | Noted: 2022-08-15

## 2022-08-15 LAB
ALBUMIN SERPL-MCNC: 3.9 G/DL (ref 3.5–5.7)
ALP SERPL-CCNC: 65 U/L (ref 34–104)
ALT SERPL W P-5'-P-CCNC: 28 U/L (ref 7–52)
ANION GAP SERPL CALCULATED.3IONS-SCNC: 9 MMOL/L (ref 3–14)
AST SERPL W P-5'-P-CCNC: 22 U/L (ref 13–39)
ATRIAL RATE - MUSE: 97 BPM
BASOPHILS # BLD AUTO: 0 10E3/UL (ref 0–0.2)
BASOPHILS NFR BLD AUTO: 1 %
BILIRUB SERPL-MCNC: 0.4 MG/DL (ref 0.3–1)
BUN SERPL-MCNC: 25 MG/DL (ref 7–25)
CALCIUM SERPL-MCNC: 9.7 MG/DL (ref 8.6–10.3)
CHLORIDE BLD-SCNC: 103 MMOL/L (ref 98–107)
CO2 SERPL-SCNC: 28 MMOL/L (ref 21–31)
CREAT SERPL-MCNC: 1.09 MG/DL (ref 0.7–1.3)
D DIMER PPP FEU-MCNC: 0.44 UG/ML FEU (ref 0–0.5)
DIASTOLIC BLOOD PRESSURE - MUSE: NORMAL MMHG
EOSINOPHIL # BLD AUTO: 0.1 10E3/UL (ref 0–0.7)
EOSINOPHIL NFR BLD AUTO: 2 %
ERYTHROCYTE [DISTWIDTH] IN BLOOD BY AUTOMATED COUNT: 13 % (ref 10–15)
GFR SERPL CREATININE-BSD FRML MDRD: 76 ML/MIN/1.73M2
GLUCOSE BLD-MCNC: 168 MG/DL (ref 70–105)
HCT VFR BLD AUTO: 42.7 % (ref 40–53)
HGB BLD-MCNC: 14.7 G/DL (ref 13.3–17.7)
IMM GRANULOCYTES # BLD: 0 10E3/UL
IMM GRANULOCYTES NFR BLD: 0 %
INTERPRETATION ECG - MUSE: NORMAL
LYMPHOCYTES # BLD AUTO: 1.2 10E3/UL (ref 0.8–5.3)
LYMPHOCYTES NFR BLD AUTO: 30 %
MCH RBC QN AUTO: 30.3 PG (ref 26.5–33)
MCHC RBC AUTO-ENTMCNC: 34.4 G/DL (ref 31.5–36.5)
MCV RBC AUTO: 88 FL (ref 78–100)
MONOCYTES # BLD AUTO: 0.6 10E3/UL (ref 0–1.3)
MONOCYTES NFR BLD AUTO: 14 %
NEUTROPHILS # BLD AUTO: 2.1 10E3/UL (ref 1.6–8.3)
NEUTROPHILS NFR BLD AUTO: 53 %
NRBC # BLD AUTO: 0 10E3/UL
NRBC BLD AUTO-RTO: 0 /100
P AXIS - MUSE: 35 DEGREES
PLATELET # BLD AUTO: 165 10E3/UL (ref 150–450)
POTASSIUM BLD-SCNC: 4.3 MMOL/L (ref 3.5–5.1)
PR INTERVAL - MUSE: 144 MS
PROT SERPL-MCNC: 6.8 G/DL (ref 6.4–8.9)
QRS DURATION - MUSE: 92 MS
QT - MUSE: 338 MS
QTC - MUSE: 429 MS
R AXIS - MUSE: 34 DEGREES
RBC # BLD AUTO: 4.85 10E6/UL (ref 4.4–5.9)
SODIUM SERPL-SCNC: 140 MMOL/L (ref 134–144)
SYSTOLIC BLOOD PRESSURE - MUSE: NORMAL MMHG
T AXIS - MUSE: 270 DEGREES
TROPONIN I SERPL-MCNC: <2.4 PG/ML (ref 0–34)
TROPONIN I SERPL-MCNC: <2.4 PG/ML (ref 0–34)
VENTRICULAR RATE- MUSE: 97 BPM
WBC # BLD AUTO: 4 10E3/UL (ref 4–11)

## 2022-08-15 PROCEDURE — 93005 ELECTROCARDIOGRAM TRACING: CPT | Performed by: EMERGENCY MEDICINE

## 2022-08-15 PROCEDURE — 93010 ELECTROCARDIOGRAM REPORT: CPT | Performed by: INTERNAL MEDICINE

## 2022-08-15 PROCEDURE — 82040 ASSAY OF SERUM ALBUMIN: CPT | Performed by: EMERGENCY MEDICINE

## 2022-08-15 PROCEDURE — 99283 EMERGENCY DEPT VISIT LOW MDM: CPT | Performed by: EMERGENCY MEDICINE

## 2022-08-15 PROCEDURE — 80053 COMPREHEN METABOLIC PANEL: CPT | Performed by: EMERGENCY MEDICINE

## 2022-08-15 PROCEDURE — 84484 ASSAY OF TROPONIN QUANT: CPT | Mod: 91 | Performed by: EMERGENCY MEDICINE

## 2022-08-15 PROCEDURE — 85379 FIBRIN DEGRADATION QUANT: CPT | Performed by: EMERGENCY MEDICINE

## 2022-08-15 PROCEDURE — 99284 EMERGENCY DEPT VISIT MOD MDM: CPT | Performed by: EMERGENCY MEDICINE

## 2022-08-15 PROCEDURE — 85025 COMPLETE CBC W/AUTO DIFF WBC: CPT | Performed by: EMERGENCY MEDICINE

## 2022-08-15 PROCEDURE — 36415 COLL VENOUS BLD VENIPUNCTURE: CPT | Performed by: EMERGENCY MEDICINE

## 2022-08-15 ASSESSMENT — ENCOUNTER SYMPTOMS
FEVER: 0
CHILLS: 0
NAUSEA: 1
ABDOMINAL PAIN: 0
CHEST TIGHTNESS: 0
AGITATION: 0
DYSURIA: 0
VOMITING: 0
NECK PAIN: 1
BACK PAIN: 0
SHORTNESS OF BREATH: 0
LIGHT-HEADEDNESS: 0

## 2022-08-15 ASSESSMENT — ACTIVITIES OF DAILY LIVING (ADL)
ADLS_ACUITY_SCORE: 35
ADLS_ACUITY_SCORE: 35

## 2022-08-15 NOTE — ED PROVIDER NOTES
History     Chief Complaint   Patient presents with     Chest Pain     HPI  Delvin Gray is a 66 year old male who comes in from work after having had an episode of right neck and chest pain.  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  He was not really doing anything to physical when he suddenly developed a very severe pain behind the right ear in his neck and then it quickly radiated down into his chest.  It was very severe for about 3 minutes and then started to let up.  He said he slowly walked a little ways and sat down, that activity did not make his symptoms any worse.  He felt perhaps a little nauseous at that time.  No diaphoresis shortness of breath lightheadedness or palpitations.  He checks his heart rate with his phone and he said it was 96, but also said arrhythmia and tachycardia.  Currently his symptoms have resolved.  Not feeling ill otherwise.  Does have history of PE.  He denies any coronary artery disease history.    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 08/15/2022     Priority: Medium     Knee joint replaced by other means 08/15/2022     Priority: Medium     Neuropathy 08/15/2022     Priority: Medium     Pulmonary embolism (H) 08/15/2022     Priority: Medium     Irregular heart rate 07/02/2020     Priority: Medium     Other chest pain 07/01/2020     Priority: Medium     Abnormal stress test on 5/14/2019 07/01/2020     Priority: Medium     Morbid obesity (H) 07/01/2020     Priority: Medium     History of pulmonary embolism on  2020     Priority: Medium     Chronic anticoagulation 2020     Priority: Medium     History of tobacco abuse on 2020     Priority: Medium     Allergic rhinitis 2020     Priority: Medium     Benign neoplasm of colon 2020     Priority: Medium     Depressive disorder 2020     Priority: Medium     Essential hypertension 2020     Priority: Medium     Gastroesophageal reflux disease 2020     Priority: Medium     Hearing loss 2020     Priority: Medium     Herpes simplex without complication 2020     Priority: Medium     Obstructive sleep apnea syndrome 2020     Priority: Medium     Osteoarthritis 2020     Priority: Medium     Presbyopia 2020     Priority: Medium     Restless legs 2020     Priority: Medium     Ulceration of transverse colon 2019     Priority: Medium     H/O adenomatous polyp of colon 2019     Priority: Medium     Environmental allergies 2012     Priority: Medium     Chronic hepatitis B virus infection (H) 2012     Priority: Medium        Past Medical History:    No past medical history on file.    Past Surgical History:    Past Surgical History:   Procedure Laterality Date     COLONOSCOPY N/A 2019    F/U  serrated adenoma       Family History:    Family History   Problem Relation Age of Onset     No Known Problems Other         No known history of bleeding or clotting disorders in the family       Social History:  Marital Status:  Single [1]  Social History     Tobacco Use     Smoking status: Former Smoker     Packs/day: 0.00     Quit date: 1996     Years since quittin.9     Smokeless tobacco: Former User   Substance Use Topics     Alcohol use: Yes     Alcohol/week: 1.0 standard drink     Types: 1 Cans of beer per week     Comment:  occasional - 4 drink per week     Drug use: No        Medications:    artificial tears OINT ophthalmic  "ointment  carboxymethylcellulose (CELLUVISC/REFRESH LIQUIGEL) 1 % ophthalmic solution  cyclobenzaprine (FLEXERIL) 10 MG tablet  DULoxetine (CYMBALTA) 60 MG capsule  fluticasone (FLONASE) 50 MCG/ACT spray  gabapentin (NEURONTIN) 400 MG capsule  gabapentin (NEURONTIN) 400 MG capsule  hypromellose (GENTEAL) 0.3 % SOLN ophthalmic solution  loratadine (CLARITIN) 10 MG tablet  losartan (COZAAR) 100 MG tablet  magnesium oxide 400 MG CAPS  metoprolol tartrate (LOPRESSOR) 25 MG tablet  NONFORMULARY  pantoprazole (PROTONIX) 40 MG EC tablet  polyethylene glycol (MIRALAX/GLYCOLAX) powder  rOPINIRole (REQUIP) 1 MG tablet  Simethicone 125 MG CAPS          Review of Systems   Constitutional: Negative for chills and fever.   HENT: Negative for congestion.    Eyes: Negative for visual disturbance.   Respiratory: Negative for chest tightness and shortness of breath.    Cardiovascular: Positive for chest pain.   Gastrointestinal: Positive for nausea. Negative for abdominal pain and vomiting.   Genitourinary: Negative for dysuria.   Musculoskeletal: Positive for neck pain. Negative for back pain.   Skin: Negative for rash.   Neurological: Negative for light-headedness.   Psychiatric/Behavioral: Negative for agitation.       Physical Exam   BP: (!) 167/84  Pulse: 98  Temp: 98.2  F (36.8  C)  Resp: 18  Height: 165.1 cm (5' 5\")  Weight: 95.3 kg (210 lb)  SpO2: 96 %      Physical Exam  Vitals and nursing note reviewed.   Constitutional:       Appearance: Normal appearance.   HENT:      Head: Normocephalic and atraumatic.      Mouth/Throat:      Mouth: Mucous membranes are moist.   Eyes:      Conjunctiva/sclera: Conjunctivae normal.   Cardiovascular:      Rate and Rhythm: Normal rate and regular rhythm.      Heart sounds: Normal heart sounds.   Pulmonary:      Effort: Pulmonary effort is normal.      Breath sounds: Normal breath sounds.   Chest:      Chest wall: No tenderness.   Abdominal:      General: Bowel sounds are normal. There is no " distension.      Tenderness: There is no abdominal tenderness.   Skin:     General: Skin is warm and dry.   Neurological:      Mental Status: He is alert and oriented to person, place, and time.   Psychiatric:         Behavior: Behavior normal.         ED Course                 Procedures                Results for orders placed or performed during the hospital encounter of 08/15/22 (from the past 24 hour(s))   CBC with platelets differential    Narrative    The following orders were created for panel order CBC with platelets differential.  Procedure                               Abnormality         Status                     ---------                               -----------         ------                     CBC with platelets and d...[[id number]]                      Final result                 Please view results for these tests on the individual orders.   Troponin I   Result Value Ref Range    Troponin I <2.4 0.0 - 34.0 pg/mL   Comprehensive metabolic panel   Result Value Ref Range    Sodium 140 134 - 144 mmol/L    Potassium 4.3 3.5 - 5.1 mmol/L    Chloride 103 98 - 107 mmol/L    Carbon Dioxide (CO2) 28 21 - 31 mmol/L    Anion Gap 9 3 - 14 mmol/L    Urea Nitrogen 25 7 - 25 mg/dL    Creatinine 1.09 0.70 - 1.30 mg/dL    Calcium 9.7 8.6 - 10.3 mg/dL    Glucose 168 (H) 70 - 105 mg/dL    Alkaline Phosphatase 65 34 - 104 U/L    AST 22 13 - 39 U/L    ALT 28 7 - 52 U/L    Protein Total 6.8 6.4 - 8.9 g/dL    Albumin 3.9 3.5 - 5.7 g/dL    Bilirubin Total 0.4 0.3 - 1.0 mg/dL    GFR Estimate 76 >60 mL/min/1.73m2   CBC with platelets and differential   Result Value Ref Range    WBC Count 4.0 4.0 - 11.0 10e3/uL    RBC Count 4.85 4.40 - 5.90 10e6/uL    Hemoglobin 14.7 13.3 - 17.7 g/dL    Hematocrit 42.7 40.0 - 53.0 %    MCV 88 78 - 100 fL    MCH 30.3 26.5 - 33.0 pg    MCHC 34.4 31.5 - 36.5 g/dL    RDW 13.0 10.0 - 15.0 %    Platelet Count 165 150 - 450 10e3/uL    % Neutrophils 53 %    % Lymphocytes 30 %    % Monocytes 14 %     % Eosinophils 2 %    % Basophils 1 %    % Immature Granulocytes 0 %    NRBCs per 100 WBC 0 <1 /100    Absolute Neutrophils 2.1 1.6 - 8.3 10e3/uL    Absolute Lymphocytes 1.2 0.8 - 5.3 10e3/uL    Absolute Monocytes 0.6 0.0 - 1.3 10e3/uL    Absolute Eosinophils 0.1 0.0 - 0.7 10e3/uL    Absolute Basophils 0.0 0.0 - 0.2 10e3/uL    Absolute Immature Granulocytes 0.0 <=0.4 10e3/uL    Absolute NRBCs 0.0 10e3/uL   D dimer quantitative   Result Value Ref Range    D-Dimer Quantitative 0.44 0.00 - 0.50 ug/mL FEU    Narrative    This D-dimer assay is intended for use in conjunction with a clinical pretest probability assessment model to exclude pulmonary embolism (PE) and deep venous thrombosis (DVT) in outpatients suspected of PE or DVT. The cut-off value is 0.50 ug/mL FEU.   Troponin I   Result Value Ref Range    Troponin I <2.4 0.0 - 34.0 pg/mL       Medications - No data to display    Assessments & Plan (with Medical Decision Making)     I have reviewed the nursing notes.    I have reviewed the findings, diagnosis, plan and need for follow up with the patient.  I see no objective evidence to suggest any type of cardiac etiology for his symptoms.  He has a EKG without any ischemic type changes.  Serial troponins are negative.  D-dimer also negative.  He does have a history of reflux and has had omeprazole, but has not been taking it lately.  Esophageal spasm is a possibility as well.  I suggested perhaps starting up his Prilosec again.  I did recommend that he follow-up with his primary provider soon as possible to discuss stress testing in case this were anginal in nature.  We will also recommend he start aspirin 81 mg daily.  Return to ER if worse.    New Prescriptions    No medications on file       Final diagnoses:   Atypical chest pain       8/15/2022   [facility]     Adan Rivas MD  08/15/22 [id number]

## 2022-08-15 NOTE — ED TRIAGE NOTES
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 Denies SOB, states pain is better at this time than nit was when it first started.      Triage Assessment     Row Name 08/15/22 0870       Triage Assessment (Adult)    Airway WDL WDL    Additional Documentation Breath Sounds (Group)       Respiratory WDL    Respiratory WDL WDL       Skin Circulation/Temperature WDL    Skin Circulation/Temperature WDL WDL       Cardiac WDL    Cardiac WDL X;chest pain       Peripheral/Neurovascular WDL    Peripheral Neurovascular WDL WDL       Cognitive/Neuro/Behavioral WDL    Cognitive/Neuro/Behavioral WDL WDL       Young Coma Scale    Best Eye Response 4-->(E4) spontaneous    Best Motor Response 6-->(M6) obeys commands    Best Verbal Response 5-->(V5) oriented    [place] Coma Scale Score 15              "

## 2022-08-15 NOTE — DISCHARGE INSTRUCTIONS
Call your clinic even later today to try to arrange follow-up.  I would strongly recommend discussing stress testing with your providers.  Return to ER if worse.  In the meantime I would take 81 mg of aspirin daily.

## 2022-10-30 ENCOUNTER — HEALTH MAINTENANCE LETTER (OUTPATIENT)
Age: 67
End: 2022-10-30

## 2022-12-01 ENCOUNTER — HOSPITAL ENCOUNTER (OUTPATIENT)
Dept: ULTRASOUND IMAGING | Facility: OTHER | Age: 67
Discharge: HOME | End: 2022-12-01
Attending: NURSE PRACTITIONER | Admitting: NURSE PRACTITIONER
Payer: COMMERCIAL

## 2022-12-01 DIAGNOSIS — Z13.9 ENCOUNTER FOR SCREENING, UNSPECIFIED: ICD-10-CM

## 2022-12-01 PROCEDURE — 76706 US ABDL AORTA SCREEN AAA: CPT

## 2023-01-01 NOTE — ED TRIAGE NOTES
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  States it was sharp at 7/10 and now decreased to 4/10 with substernal with radiation to his neck.  SOB with episode, pain not worse with inspiration or palpation but worse with activity reported by patient   Significant symptoms had onset 1 hour(s) ago.  BP (!) 167/106   Temp 97.5  F (36.4  C) (Tympanic)   Resp 15   Ht 1.651 m (5' 5\")   Wt 100.5 kg (221 lb 8 oz)   SpO2 99%   BMI 36.86 kg/m  t  Patient appears alert , in moderate distress., and cooperative behavior.    GCS Total = 15  Airway: intact  Breathing noted as Normal.  Circulation Normal  Skin normal  Action taken:  Triage to critical care immediately      [facility] PRIOR LIVING SITUATION Son  "
Statement Selected

## 2023-03-24 ENCOUNTER — HOSPITAL ENCOUNTER (OUTPATIENT)
Dept: PHYSICAL THERAPY | Facility: OTHER | Age: 68
Setting detail: THERAPIES SERIES
Discharge: HOME | End: 2023-03-24
Attending: NURSE PRACTITIONER
Payer: COMMERCIAL

## 2023-03-24 DIAGNOSIS — M25.551 HIP PAIN, RIGHT: ICD-10-CM

## 2023-03-24 PROCEDURE — 97035 APP MDLTY 1+ULTRASOUND EA 15: CPT | Mod: GP,PO

## 2023-03-24 PROCEDURE — 97110 THERAPEUTIC EXERCISES: CPT | Mod: GP,PO

## 2023-03-24 PROCEDURE — 97161 PT EVAL LOW COMPLEX 20 MIN: CPT | Mod: GP,PO

## 2023-03-27 NOTE — PROGRESS NOTES
03/24/23 1400   General Information   Type of Visit Initial OP Ortho PT Evaluation   Start of Care Date 03/24/23   Referring Physician Livier Kan   Patient/Family Goals Statement to be able to walk w/o hip pain   Orders Evaluate and Treat   Date of Order 03/03/23   Certification Required? No   Medical Diagnosis Hip pain, right (M25.551)   Precautions/Limitations no known precautions/limitations   Special Instructions None   Body Part(s)   Body Part(s) Hip   Presentation and Etiology   Pertinent history of current problem (include personal factors and/or comorbidities that impact the POC) Patient is a 68 y/o male whom presents to PT with c/o right hip pain. Patient reports he developed right lateral hip pain about a month ago . He now cannot lay on the right side. Reports he has had difficulty climbing ladders. The pain is decreased recently but he remains unable to lay on the right side. No imagery was completed. He is now variably able to walk nml but has difficulty with completion of nml work tasks.   Impairments A. Pain;D. Decreased ROM;E. Decreased flexibility   Functional Limitations perform required work activities;perform desired leisure / sports activities   Symptom Location Right lateral hip   How/Where did it occur With repetition/overuse;From insidious onset   Onset date of current episode/exacerbation 02/08/23   Chronicity New   Pain quality B. Dull;C. Aching;D. Burning   Frequency of pain/symptoms B. Intermittent   Pain/symptoms are: Other   Pain symptoms comment When he lays on the right side.   Pain/symptoms exacerbated by M. Other   Pain exacerbation comment Laying on the right side   Pain/symptoms eased by E. Changing positions;I. OTC medication(s);H. Cold   Progression of symptoms since onset: Improved   Current / Previous Interventions   Diagnostic Tests: Other   Other diagnostic tests No imagery   Prior Level of Function   Prior Level of Function-Mobility NML   Prior Level of Function-ADLs  NML   Current Level of Function   Current Community Support Family/friend caregiver   Patient role/employment history A. Employed   Employment Comments Standing on his feet all day. Wears clogs   Living environment House/townhome   Home/community accessibility NML   Current equipment-Gait/Locomotion None   Fall Risk Screen   Fall screen completed by PT   Have you fallen 2 or more times in the past year? No   Have you fallen and had an injury in the past year? No   Is patient a fall risk? No   Abuse Screen (yes response referral indicated)   Feels Unsafe at Home or Work/School no   Feels Threatened by Someone no   Does Anyone Try to Keep You From Having Contact with Others or Doing Things Outside Your Home? no   Physical Signs of Abuse Present no   Hip Objective Findings   Gait/Locomotion Appears without antalgia   Balance/Proprioception (Single Leg Stance) Fair plus bilaterally   Side (if bilateral, select both right and left) Right   Hip ROM Comments Generally equal right to left   Lumbar ROM Within functional limits   Pelvic Screen Without noted malalignment   Functional Closed Chain Screen Patient is able to complete full squat without hip pain   Hip/Knee Strength Comments Right lateral hip pain with full resistance to hip abduction   Femoral Nerve Stretch Test Negative   Gluteal Tendopathy Test Negative   Resisted Hip Adduction Test Negative   Straight Leg Raise Test Negative   Scour Test Negative   SNEHA Test Mild positive   FADIR Test Negative   Palpation Noted tenderness of 2-3/4 with palpation directly over the greater trochanter of the right hip   Observation No deformity   Integumentary  Normal   Posture Abdominal obesity with increased lumbar lordosis   Adan Flexibility Test Negative   Obers/ITB Flexibility Mild positive right   Right Hamstring Flexibility Bilateral hamstring tightness   Right Piriformis Flexibility Noted right piriformis tightness   Right Hip Flexion PROM 105   Right Hip Abduction PROM  43   Right Hip Adduction PROM 37   Right Hip ER PROM 32   Right Hip IR PROM 28   Right Hip Ext PROM 10   Right Hip Flexion Strength 5/5   Right Hip Abduction Strength 4+/5   Right Hip Extension Strength 4 -/5   Right Hip IR Strength 5/5   Right Hip ER Strength 4+/5   Right Knee Flexion Strength 5/5   Right Knee Extension Strength 5/5   Planned Therapy Interventions   Planned Therapy Interventions joint mobilization;manual therapy;ROM;strengthening;stretching   Planned Modality Interventions   Planned Modality Interventions Cryotherapy;Hot packs;Ultrasound;Iontophoresis   Planned Modality Interventions Comments Phonophoresis with 10% ketoprofen gel and iontophoresis with 4% dexamethasone may be used.  Modalities will be used as an adjunct to the intended course of exercise based on manual physical therapy treatment   Clinical Impression   Criteria for Skilled Therapeutic Interventions Met yes, treatment indicated   PT Diagnosis Impaired mobility of the right lower extremity due to greater trochanteric bursitis with lateral hip pain   Influenced by the following impairments Pain, limited mobility limited strength   Functional limitations due to impairments Patient is unable to lay on the right side for sleep.  Patient has variable difficulty with sitting depending on chair surface due to lateral hip pain.  Difficulty with ambulating up and down stairs   Clinical Presentation Stable/Uncomplicated   Clinical Decision Making (Complexity) Low complexity   Therapy Frequency 2 times/Week   Predicted Duration of Therapy Intervention (days/wks) 8 weeks   Risk & Benefits of therapy have been explained Yes   Patient, Family & other staff in agreement with plan of care Yes   Clinical Impression Comments Greater trochanteric bursitis   Education Assessment   Preferred Learning Style Listening;Reading;Demonstration;Pictures/video   ORTHO GOALS   PT Ortho Eval Goals 1;2;3   Ortho Goal 1   Goal Description Pain   Goal Progress  Patient will report he is able to sleep comfortably at least 5 of 7 nights per week with laying on the right side as desired without limitation due to lateral hip pain in 8 weeks   Target Date 05/24/23   Ortho Goal 2   Goal Identifier Gait   Goal Description Patient will be able to ambulate up and down stairs as desired and on uneven wooded trails without limitation due to lateral hip pain in 8 weeks   Target Date 05/24/23   Ortho Goal 3   Goal Identifier Strength   Goal Progress Patient will demonstrate 5/5 strength in the entire hip knee and ankle muscle and will be able to complete all activities and tasks as required at work and home without limitation due to right hip instability and/or lateral hip strain with all   Total Evaluation Time   PT Dina, Low Complexity Minutes (84726) 11

## 2023-04-03 ENCOUNTER — HOSPITAL ENCOUNTER (OUTPATIENT)
Dept: PHYSICAL THERAPY | Facility: OTHER | Age: 68
Setting detail: THERAPIES SERIES
Discharge: HOME | End: 2023-04-03
Attending: NURSE PRACTITIONER
Payer: COMMERCIAL

## 2023-04-03 PROCEDURE — 97035 APP MDLTY 1+ULTRASOUND EA 15: CPT | Mod: GP,PO

## 2023-04-03 PROCEDURE — 97140 MANUAL THERAPY 1/> REGIONS: CPT | Mod: GP,PO

## 2023-04-03 PROCEDURE — 97110 THERAPEUTIC EXERCISES: CPT | Mod: GP,PO

## 2023-04-05 ENCOUNTER — HOSPITAL ENCOUNTER (OUTPATIENT)
Dept: PHYSICAL THERAPY | Facility: OTHER | Age: 68
Setting detail: THERAPIES SERIES
Discharge: HOME | End: 2023-04-05
Attending: NURSE PRACTITIONER
Payer: COMMERCIAL

## 2023-04-05 PROCEDURE — 97110 THERAPEUTIC EXERCISES: CPT | Mod: GP,PO

## 2023-04-05 PROCEDURE — 97035 APP MDLTY 1+ULTRASOUND EA 15: CPT | Mod: GP,PO

## 2023-04-10 ENCOUNTER — HOSPITAL ENCOUNTER (OUTPATIENT)
Dept: PHYSICAL THERAPY | Facility: OTHER | Age: 68
Setting detail: THERAPIES SERIES
Discharge: HOME | End: 2023-04-10
Attending: NURSE PRACTITIONER
Payer: COMMERCIAL

## 2023-04-10 PROCEDURE — 97110 THERAPEUTIC EXERCISES: CPT | Mod: GP,PO

## 2023-04-12 ENCOUNTER — HOSPITAL ENCOUNTER (OUTPATIENT)
Dept: PHYSICAL THERAPY | Facility: OTHER | Age: 68
Setting detail: THERAPIES SERIES
Discharge: HOME | End: 2023-04-12
Attending: NURSE PRACTITIONER
Payer: COMMERCIAL

## 2023-04-12 PROCEDURE — 97110 THERAPEUTIC EXERCISES: CPT | Mod: GP,PO

## 2023-04-21 ENCOUNTER — HOSPITAL ENCOUNTER (OUTPATIENT)
Dept: PHYSICAL THERAPY | Facility: OTHER | Age: 68
Setting detail: THERAPIES SERIES
Discharge: HOME | End: 2023-04-21
Attending: NURSE PRACTITIONER
Payer: COMMERCIAL

## 2023-04-21 PROCEDURE — 97110 THERAPEUTIC EXERCISES: CPT | Mod: GP,PO

## 2023-04-28 NOTE — PROGRESS NOTES
"[facility]    Outpatient Physical Therapy Discharge Note  Patient: Delvin Gray  : 1955    Beginning/End Dates of Reporting Period:  2023      Referring Provider: Livier Kan NP    Therapy Diagnosis: Left hip pain     Client Self Report: (P) Reports he has been having cramps in his legs. He reports that he does not drink much water. He has not been doing his exercise the past few days due to this. His hip feels \"great\".    Objective Measurements:  Objective Measure: Palpation/tenderness  Details: Decreased tenderness of thge right greater trochanteric bursa to  0-1/4  Objective Measure: Right hip strength  Details: (P) All hip muscle group 5/5 MMT  Objective Measure: Right hip mobility  Details: (P) NBML and equal right to left                            Goals:  Goal Identifier     Goal Description Pain   Target Date 23   Date Met  (P) 23   Progress (detail required for progress note): Patient will report he is able to sleep comfortably at least 5 of 7 nights per week with laying on the right side as desired without limitation due to lateral hip pain in 8 weeks     Goal Identifier Gait   Goal Description Patient will be able to ambulate up and down stairs as desired and on uneven wooded trails without limitation due to lateral hip pain in 8 weeks   Target Date 23   Date Met  (P) 23   Progress (detail required for progress note):       Goal Identifier Strength   Goal Description     Target Date     Date Met  (P) 23   Progress (detail required for progress note): Patient will demonstrate 5/5 strength in the entire hip knee and ankle muscle and will be able to complete all activities and tasks as required at work and home without limitation due to right hip instability and/or lateral hip strain with all     Plan:  Patient did not return for his final scheduled " appointment. It is assumed he continues to do well. Discharge from therapy.     Discharge:    Reason for Discharge: Patient has met all goals.    Equipment Issued: None    Discharge Plan: Patient to continue home program.

## 2023-06-09 ENCOUNTER — DOCUMENTATION ONLY (OUTPATIENT)
Dept: OTHER | Facility: CLINIC | Age: 68
End: 2023-06-09

## 2023-08-29 ENCOUNTER — THERAPY VISIT (OUTPATIENT)
Dept: OCCUPATIONAL THERAPY | Facility: OTHER | Age: 68
End: 2023-08-29
Payer: COMMERCIAL

## 2023-08-29 DIAGNOSIS — Z98.890 HISTORY OF CARPAL TUNNEL RELEASE: ICD-10-CM

## 2023-08-29 PROCEDURE — 97165 OT EVAL LOW COMPLEX 30 MIN: CPT | Mod: GO | Performed by: OCCUPATIONAL THERAPIST

## 2023-08-29 PROCEDURE — 97140 MANUAL THERAPY 1/> REGIONS: CPT | Mod: GO | Performed by: OCCUPATIONAL THERAPIST

## 2023-08-29 PROCEDURE — 97018 PARAFFIN BATH THERAPY: CPT | Mod: GO | Performed by: OCCUPATIONAL THERAPIST

## 2023-08-29 PROCEDURE — 97110 THERAPEUTIC EXERCISES: CPT | Mod: GO | Performed by: OCCUPATIONAL THERAPIST

## 2023-08-29 NOTE — PROGRESS NOTES
OCCUPATIONAL THERAPY EVALUATION  Type of Visit: Evaluation    See electronic medical record for Abuse and Falls Screening details.    Subjective  Pt had Had he reports diffiulty and pian at the base of his thumb. He also feels a dull, throbbing feeling in the palm of his hand.      Presenting condition or subjective complaint:  Pt had Had he reports diffiulty and pian at the base of his thumb. He also feels a dull, throbbing feeling in the palm of his hand. He is not able to touch his thumb to his little finger like his other hand. The strength is returning. Denies numbness and tingling.   Date of onset: 05/18/23    Relevant medical history:   reviewed  Dates & types of surgery: OCTR    Prior diagnostic imaging/testing results:       Prior therapy history for the same diagnosis, illness or injury:        Prior Level of Function  Transfers: Independent  Ambulation: Independent  ADL:       Patient goals for therapy:  Get back to regular functionality    Pain assessment: See objective evaluation for additional pain details     Objective   See flow sheet    Assessment & Plan   CLINICAL IMPRESSIONS  Medical Diagnosis: Z98.890 (ICD-10-CM) - History of carpal tunnel release    Treatment Diagnosis:      Impression/Assessment: Pt is a 67 year old male presenting to Occupational Therapy due to S/P OCRT on left.  The following significant findings have been identified: .  These identified deficits interfere with their ability to perform work tasks and recreational activities as compared to previous level of function.   Patient's limitations or Problem List includes: Pain, Decreased ROM/motion, Decreased , and Decreased pinch of the left hand and thumb which interferes with the patient's ability to perform Work Tasks as compared to previous level of function.    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   Patient/Family/caregiver agrees with Plan of Care.     Evaluation Time:            Signing Clinician: Pretty Marti OT

## 2023-08-31 NOTE — PROGRESS NOTES
08/29/23 0500   Appointment Info   Treating Provider Pretty Casey OTR/l   Total/Authorized Visits 15   Visits Used 1   Medical Diagnosis Z98.890 (ICD-10-CM) - History of carpal tunnel release   OT Tx Diagnosis decrease in ROM and strength   Progress Note/Certification   Onset of Illness/Injury or Date of Surgery 05/18/23   Therapy Frequency 6 visits   Predicted Duration 8 weeks   OT Goal 1   Goal Identifier HEP   Goal Description Pt will be indepdent in HEP to improve strength ans funtionality of hand   Rationale In order to maximize safety and independence with performance of self-care activities   Target Date 09/14/23   OT Goal 2   Goal Identifier Pain   Goal Description Pt will reports pian levels <3/10 hle gripping items   Rationale In order to maximize safety and independence with performance of self-care activities   Target Date 09/28/23   OT Goal 3   Goal Identifier hand strength   Goal Description Pt will increae left halnd strength to 65# or greater   Rationale In order to maximize safety and independence with performance of self-care activities   Target Date 10/12/23   Subjective Report   Subjective Report Pt reports he would liek to improve the functionality of his hand. He reports pian in the thenar, decreaes ability to make a fist, and hand weakness.   Objective Measure 1   Details : Rt 81 Lt 50 lateral: Rt 21 LT: 13 Three point: Rt: 14 Lt 8   Objective Measure 2   Objective Measure Thumb ROM   Details MCP: 50 DIP 65 LT MCP 60  DIP 76   Paraffin Bath   Paraffin Minutes (89712) 8 Minutes   Treatment Detail Dipped hand in paraffin,m wrap in saran wrap ans towel to contain heat   Patient Response/Progress improved ROM and decrease in soreness   Therapeutic Procedure/Exercise   Therapeutic Procedure: strength, endurance, ROM, flexibillity minutes (64712) 25   Ther Proc 1 - Details pt completed tendong gliding exercises, hand strengthening with hadn gripper and one red band, blue sponse.    Skilled Intervention edcuate and perform exercises to imprve ROM and strength   Patient Response/Progress improved ROM with no increase in pian   Manual Therapy   Manual Therapy Minutes (71380) 10   Manual Therapy 1 STM and scar massage with dycem for tissue remodeling ans decrease sensitivity   Skilled Intervention educate in ans utilize varoous technique for desensitization, tissue remodeling,   Eval/Assessments   OT Eval, Low Complexity Minutes (72933) 20   Education   Learner/Method Patient;Listening;Reading;Demonstration;Pictures/Video;No Barriers to Learning   Plan   Home program Access Code: [email or web address]  URL: [email or web address]  Date: 08/29/2023  Prepared by: Pretty Marti    Exercises  - Hand AROM Tendon Gliding Series  - 1 x daily - 7 x weekly - 3 sets - 10 reps  - Hand Strengthening Key Pinch With Foam  - 1 x daily - 7 x weekly - 3 sets - 10 reps  - Hand Strengthening Tip Pinch With Foam  - 1 x daily - 7 x weekly - 3 sets - 10 reps   Total Session Time   Timed Code Treatment Minutes 35   Total Treatment Time (sum of timed and untimed services) 63

## 2023-11-12 ENCOUNTER — HEALTH MAINTENANCE LETTER (OUTPATIENT)
Age: 68
End: 2023-11-12

## 2024-01-23 ENCOUNTER — TELEPHONE (OUTPATIENT)
Dept: CARDIOLOGY | Facility: OTHER | Age: 69
End: 2024-01-23

## 2024-01-23 NOTE — TELEPHONE ENCOUNTER
Patient verified .  Pt called for stress test with appointment time given 24 at 0700 am.  .  Patient verbalized understanding.  Instructions given No caffeine for 12 hours, no food for 4 hours.  Ok to take all medications as usual with water.  Test will take 3 hours.

## 2024-02-07 ENCOUNTER — TELEPHONE (OUTPATIENT)
Dept: CARDIOLOGY | Facility: OTHER | Age: 69
End: 2024-02-07

## 2024-02-07 NOTE — TELEPHONE ENCOUNTER
Pt verified his .  Called re:  stress test that was aborted to due pt having drank caffeine.  Per [facility]icemail [facility] stated [facility] are requiring the patient to call in to get a new authorization.  RN instructed this to patient that per [facility] must [facility] to get a new [facility].   Pt verbalized understanding.

## 2024-03-12 ENCOUNTER — HOSPITAL ENCOUNTER (EMERGENCY)
Facility: OTHER | Age: 69
Discharge: HOME | End: 2024-03-12
Attending: PHYSICIAN ASSISTANT | Admitting: PHYSICIAN ASSISTANT
Payer: COMMERCIAL

## 2024-03-12 ENCOUNTER — APPOINTMENT (OUTPATIENT)
Dept: GENERAL RADIOLOGY | Facility: OTHER | Age: 69
End: 2024-03-12
Attending: PHYSICIAN ASSISTANT
Payer: COMMERCIAL

## 2024-03-12 VITALS
TEMPERATURE: 98.3 F | RESPIRATION RATE: 20 BRPM | HEART RATE: 91 BPM | HEIGHT: 65 IN | WEIGHT: 210 LBS | OXYGEN SATURATION: 98 % | DIASTOLIC BLOOD PRESSURE: 86 MMHG | SYSTOLIC BLOOD PRESSURE: 168 MMHG | BODY MASS INDEX: 34.99 KG/M2
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
## 2024-03-12 DIAGNOSIS — S49.92XA SHOULDER INJURY, LEFT, INITIAL ENCOUNTER: ICD-10-CM

## 2024-03-12 PROCEDURE — 99283 EMERGENCY DEPT VISIT LOW MDM: CPT | Performed by: PHYSICIAN ASSISTANT

## 2024-03-12 PROCEDURE — 73030 X-RAY EXAM OF SHOULDER: CPT | Mod: LT

## 2024-03-12 PROCEDURE — 99284 EMERGENCY DEPT VISIT MOD MDM: CPT | Performed by: PHYSICIAN ASSISTANT

## 2024-03-12 RX ORDER — HYDROXYZINE PAMOATE 25 MG/1
25 CAPSULE ORAL 4 TIMES DAILY PRN
Qty: 12 CAPSULE | Refills: 0 | Status: SHIPPED | OUTPATIENT
Start: 2024-03-12 | End: 2024-03-12

## 2024-03-12 RX ORDER — METHYLPREDNISOLONE 4 MG
TABLET, DOSE PACK ORAL
Qty: 21 TABLET | Refills: 0 | Status: SHIPPED | OUTPATIENT
Start: 2024-03-12

## 2024-03-12 RX ORDER — HYDROXYZINE PAMOATE 25 MG/1
25 CAPSULE ORAL 4 TIMES DAILY PRN
Qty: 12 CAPSULE | Refills: 0 | Status: SHIPPED | OUTPATIENT
Start: 2024-03-12

## 2024-03-12 RX ORDER — HYDROCODONE BITARTRATE AND ACETAMINOPHEN 5; 325 MG/1; MG/1
1 TABLET ORAL EVERY 6 HOURS PRN
Qty: 12 TABLET | Refills: 0 | Status: SHIPPED | OUTPATIENT
Start: 2024-03-12

## 2024-03-12 RX ORDER — METHYLPREDNISOLONE 4 MG
TABLET, DOSE PACK ORAL
Qty: 21 TABLET | Refills: 0 | Status: SHIPPED | OUTPATIENT
Start: 2024-03-12 | End: 2024-03-12

## 2024-03-12 RX ORDER — HYDROCODONE BITARTRATE AND ACETAMINOPHEN 5; 325 MG/1; MG/1
1 TABLET ORAL EVERY 6 HOURS PRN
Qty: 12 TABLET | Refills: 0 | Status: SHIPPED | OUTPATIENT
Start: 2024-03-12 | End: 2024-03-12

## 2024-03-12 ASSESSMENT — COLUMBIA-SUICIDE SEVERITY RATING SCALE - C-SSRS
2. HAVE YOU ACTUALLY HAD ANY THOUGHTS OF KILLING YOURSELF IN THE PAST MONTH?: NO
6. HAVE YOU EVER DONE ANYTHING, STARTED TO DO ANYTHING, OR PREPARED TO DO ANYTHING TO END YOUR LIFE?: NO
1. IN THE PAST MONTH, HAVE YOU WISHED YOU WERE DEAD OR WISHED YOU COULD GO TO SLEEP AND NOT WAKE UP?: NO

## 2024-03-12 ASSESSMENT — ACTIVITIES OF DAILY LIVING (ADL): ADLS_ACUITY_SCORE: 35

## 2024-03-12 NOTE — ED PROVIDER NOTES
"      EMERGENCY DEPARTMENT ENCOUNTER      NAME: Delvin Gray  AGE: 68 year old male  YOB: 1955  MRN: [id number]  EVALUATION DATE & TIME: 3/12/2024 11:42 AM    PCP: Jen Galvan    ED PROVIDER: Joe Blake PA-C       CHIEF COMPLAINT:  Chief Complaint   Patient presents with    Shoulder Pain       ED COURSE, MEDICAL DECISION MAKING, FINAL IMPRESSION AND PLAN:     Assessment / Plan:  1. Shoulder injury, left, initial encounter    2. Fall from slip, trip, or stumble, initial encounter        The patient was interviewed and examined.  HPI and physical exam as below.  Differential diagnosis and MDM Key Documentation Elements as below.  Vitals, triage note, and nursing notes were reviewed.  BP (!) 168/86   Pulse 91   Temp 98.3  F (36.8  C) (Tympanic)   Resp 20   Ht 1.651 m (5' 5\")   Wt 95.3 kg (210 lb)   SpO2 98%   BMI 34.95 kg/m      Differential includes but is not limited to shoulder fracture, shoulder dislocation, shoulder strain, rotator cuff injury    Patient with full ocular range of motion with pain.  Initial difficulty with initiation of forward flexion.  Weakness with external rotation.  Concerns for possible rotator cuff injury.  X-rays today negative for acute fracture or dislocation.  No signs of neurovascular compromise.  No signs of head or neck injury.  Plan is for patient to be placed in a sling for comfort.  Range of motion exercises discussed.  Would recommend following up with orthopedics for further evaluation.  Physical therapy may be beneficial.  Patient to use oral steroids and pain medications as needed for any discomfort.  Work slip given.  Patient discharged home in stable condition    Pertinent Labs & Imaging studies reviewed. (See chart for details)  Results for orders placed or performed during the hospital encounter of 03/12/24   XR Shoulder Left G/E 3 Views    Impression    Impression:  No acute fracture or dislocation.    SALVATORE CAIN MD       " "  SYSTEM ID:  [id number]     No results found for: \"ABORH\"      Reassessments, Medications, Interventions, & Response to Treatments:  -as above    Medications given during today's ER visit:  Medications - No data to display    Consultations:  None    Decision Rules, Medical Calculators, and Risk Stratification Tools:  None    MDM Key Documentation Elements for Patient's Evaluation:  Differential diagnosis to include high risk considerations: As above  Escalation to admission/observation considered: Admission/observation considered, but patient does not meet admission criteria  Discussions and management with other clinicians:    3a. Independent interpretation of testing performed by another health professional:  -No  3b. Discussion of management or test interpretation with another health professional: -No  Independent interpretation of tests:  Ordering and/or review of 1 test(s)  Discussion of test interpretations with radiology:  No  History obtained from source other than patient or assessment requiring an independent historian:  No  Review of non-ED/external records:  review of 1 records  Diagnostic tests considered but not ultimately performed/deferred:  -MRI left shoulder  Prescription medications considered but not prescribed:  -Oxycodone  Chronic conditions affecting care:  -None  Care affected by social determinants of health:  -None    The patient's management involved:   - Imaging studies  - Prescription drug management      A shared decision making model was used. Time was taken to answer all questions.  Patient and/or associated parties understood and were agreeable to treatment plan.  Plan and all results were discussed. Warning signs and close return precautions to return to the ED given. Copy of results given. Discharged in stable condition. Discharged with discharge instructions outlining plan for further care and follow up.      New prescriptions started at today's ER visit  Discharge Medication " List as of 3/12/2024 12:27 PM        START taking these medications    Details   HYDROcodone-acetaminophen (NORCO) 5-325 MG tablet Take 1 tablet by mouth every 6 hours as needed for pain, Disp-12 tablet, R-0, E-Prescribe      hydrOXYzine ziyad (VISTARIL) 25 MG capsule Take 1 capsule (25 mg) by mouth 4 times daily as needed for itching, Disp-12 capsule, R-0, E-Prescribe      methylPREDNISolone (MEDROL DOSEPAK) 4 MG tablet therapy pack Follow Package Directions, Disp-21 tablet, R-0, E-Prescribe             =================================================================    HPI  Delvin Gray is a pleasant 68 year old male here who presents to the ER today complaining of a shoulder injury.  Patient is right-hand dominant.  Patient states he got out of his car and slipped and unsure of what he did to his shoulder now having pain throughout his left shoulder.  Difficulty with initiating movement in the shoulder but once his arm is off he can keep it up.  Also having pain when turning his arm outward.  Denies any prior shoulder injuries.  Denies any head or neck injury.  No numbness or paresthesias today.  Took naproxen 500 mg with minimal improvement in symptoms.      REVIEW OF SYSTEMS   Review of Systems  As above, otherwise ROS is unremarkable.      PAST MEDICAL HISTORY:  History reviewed. No pertinent past medical history.    PAST SURGICAL HISTORY:  Past Surgical History:   Procedure Laterality Date    COLONOSCOPY N/A 9/23/2019    F/U 2024 serrated adenoma       CURRENT MEDICATIONS:    Current Outpatient Medications   Medication Instructions    artificial tears OINT ophthalmic ointment Both Eyes, AT BEDTIME PRN    carboxymethylcellulose (CELLUVISC/REFRESH LIQUIGEL) 1 % ophthalmic solution 1 drop, Both Eyes, 3 TIMES DAILY PRN    cyclobenzaprine (FLEXERIL) 10 mg, Oral, 2 TIMES DAILY PRN    DULoxetine (CYMBALTA) 60 mg, Oral, DAILY    fluticasone (FLONASE) 50 MCG/ACT spray 2 sprays, Both Nostrils, DAILY    gabapentin  "(NEURONTIN) 1,200 mg, Oral, AT BEDTIME    gabapentin (NEURONTIN) 400-1,200 mg, Oral, 2 TIMES DAILY PRN    hypromellose (GENTEAL) 0.3 % SOLN ophthalmic solution 1 drop, Both Eyes, EVERY 1 HOUR PRN    loratadine (CLARITIN) 10 mg, Oral, DAILY    losartan (COZAAR) 100 mg, Oral, DAILY    magnesium oxide 400 MG CAPS 1 capsule, Oral, DAILY    metoprolol tartrate (LOPRESSOR) 25 mg, Oral, 2 TIMES DAILY    NONFORMULARY 3 capsules, Oral, DAILY, Heal and soothe<BR>Systemic Multi-Enzyme Therapy (SMET)     pantoprazole (PROTONIX) 40 mg, Oral, DAILY    polyethylene glycol (MIRALAX) 17 g, Oral, DAILY PRN    rOPINIRole (REQUIP) 1 mg, Oral, DAILY PRN    Simethicone 125 MG CAPS 1 capsule, Oral, 4 TIMES DAILY WITH MEALS & NIGHTLY       ALLERGIES:  Allergies   Allergen Reactions    Seasonal Allergies      Other reaction(s): Other - Describe In Comment Field  SEASONAL, Reaction: runny nose and watery eyes       FAMILY HISTORY:  Family History   Problem Relation Age of Onset    No Known Problems Other         No known history of bleeding or clotting disorders in the family       SOCIAL HISTORY:   Social History     Socioeconomic History    Marital status: Single     Spouse name: None    Number of children: None    Years of education: None    Highest education level: None   Tobacco Use    Smoking status: Former     Packs/day: 0     Types: Cigarettes     Quit date: 1996     Years since quittin.4    Smokeless tobacco: Former   Substance and Sexual Activity    Alcohol use: Yes     Alcohol/week: 1.0 standard drink of alcohol     Types: 1 Cans of beer per week     Comment:  occasional - 4 drink per week    Drug use: No   Social History Narrative    [facility], works at a local [employer], lives independently.       PHYSICAL EXAM    VITAL SIGNS: BP (!) 168/86   Pulse 91   Temp 98.3  F (36.8  C) (Tympanic)   Resp 20   Ht 1.651 m (5' 5\")   Wt 95.3 kg (210 lb)   SpO2 98%   BMI 34.95 kg/m      No data " found.      Physical Exam     Vitals and nursing note reviewed.   Constitutional:       General: Active.   HENT:      Nose: Nose normal.      Mouth/Throat:      Mouth: Mucous membranes are moist.      Pharynx: Oropharynx is clear.   Eyes:      Conjunctiva/sclera: Conjunctivae normal.   Musculoskeletal:      Comments: No midline C-spine tenderness.  Patient with tenderness through the anterior and posterior shoulder.  Patient does have full active range of motion with pain.  Patient does have slowness initiating forward flexion.  Patient with significant pain and weakness with external rotation.  Normal internal rotation.  No left elbow tenderness.  NVI  Skin:     General: Skin is warm and dry.   Neurological:      General: No focal deficit present.      Mental Status: Alert and oriented for age.   Psychiatric:         Mood and Affect: Mood normal.    LABS & RADIOLOGY:  All pertinent labs reviewed and interpreted. Reviewed all pertinent imaging. Please see official radiology report.  Results for orders placed or performed during the hospital encounter of 03/12/24   XR Shoulder Left G/E 3 Views    Impression    Impression:  No acute fracture or dislocation.    SALVATORE CAIN MD         SYSTEM ID:  [id number]     XR Shoulder Left G/E 3 Views   Final Result   Impression:   No acute fracture or dislocation.      SALVATORE CAIN MD            SYSTEM ID:  [id number]                Kevin TRIPP PA-C, personally performed the services described in this documentation, and it is both accurate and complete.       Joe lBake PA-C  03/12/24 1229       Joe Blake PA-C  03/15/24 3998

## 2024-03-12 NOTE — ED TRIAGE NOTES
"ED Nursing Triage Note (General)   ________________________________    Delvin Gray is a 68 year old Male that presents to triage via private vehicle with complaints of L) shoulder injury.  Patient states he got home from work last night and states he got out of his car and slipped and is unsure how he landed, however, is now having pain. Patient is unable to lift his arm without assistance. Patient states when he woke up his hands were numb/tingling, however, this has now resolved.  Patient states he took 500mg of naproxen prior to arrival with little relief.   Significant symptoms had onset 12 hour(s) ago.  BP (!) 168/86   Pulse 91   Temp 98.3  F (36.8  C) (Tympanic)   Resp 20   Ht 1.651 m (5' 5\")   Wt 95.3 kg (210 lb)   SpO2 98%   BMI 34.95 kg/m     PRE HOSPITAL PRIOR LIVING SITUATION Alone      Triage Assessment (Adult)       Row Name 03/12/24 1137          Triage Assessment    Airway WDL WDL        Respiratory WDL    Respiratory WDL WDL        Skin Circulation/Temperature WDL    Skin Circulation/Temperature WDL WDL        Cardiac WDL    Cardiac WDL WDL     Cardiac Rhythm NSR        Peripheral/Neurovascular WDL    Peripheral Neurovascular WDL WDL        Cognitive/Neuro/Behavioral WDL    Cognitive/Neuro/Behavioral WDL WDL                     "

## 2024-03-12 NOTE — Clinical Note
Delvin Gray was seen and treated in our emergency department on 3/12/2024.  He may return to work on 03/13/2024.  No use of left arm until cleared by orthopedics.  No lifting more than 5 pounds.  No overhead arm movement.  Sling may be worn for comfort.     If you have any questions or concerns, please don't hesitate to call.      Joe Blake PA-C

## 2024-03-12 NOTE — DISCHARGE INSTRUCTIONS
-Use steroid Dosepak for inflammation  -Use either Tylenol or Norco every 6 hours as needed for pain.  Do not use both together as they both contain Tylenol.  Also use Vistaril 25 mg every 6 hours for pain and muscle spasm.  -Use sling for comfort.  Practice full active range of motion with your shoulder to prevent frozen shoulder.  -Follow-up with orthopedics and physical therapy for further evaluation.

## 2024-03-18 ENCOUNTER — TELEPHONE (OUTPATIENT)
Dept: CARDIOLOGY | Facility: OTHER | Age: 69
End: 2024-03-18

## 2024-03-18 NOTE — TELEPHONE ENCOUNTER
Patient verified .  Reminder call for stress test with instructions given.  Patient verbalized understanding.    Emphasis on no caffeine for 12 hours prior to testing  Patient states he understands and agrees

## 2024-03-21 ENCOUNTER — HOSPITAL ENCOUNTER (OUTPATIENT)
Dept: NUCLEAR MEDICINE | Facility: OTHER | Age: 69
Discharge: HOME | End: 2024-03-21
Attending: NURSE PRACTITIONER
Payer: COMMERCIAL

## 2024-03-21 VITALS — WEIGHT: 210 LBS | HEIGHT: 65 IN | BODY MASS INDEX: 34.99 KG/M2

## 2024-03-21 DIAGNOSIS — R06.02 SOB (SHORTNESS OF BREATH): ICD-10-CM

## 2024-03-21 LAB
CV BLOOD PRESSURE: 80 MMHG
CV STRESS MAX HR HE: 104
NUC STRESS EJECTION FRACTION: 72 %
RATE PRESSURE PRODUCT: NORMAL
STRESS ECHO BASELINE DIASTOLIC HE: 98
STRESS ECHO BASELINE HR: 73 BPM
STRESS ECHO BASELINE SYSTOLIC BP: 169
STRESS ECHO CALCULATED PERCENT HR: 68 %
STRESS ECHO LAST STRESS DIASTOLIC BP: 84
STRESS ECHO LAST STRESS SYSTOLIC BP: 174
STRESS ECHO POST ESTIMATED WORKLOAD: 1 METS
STRESS ECHO TARGET HR: 154

## 2024-03-21 PROCEDURE — A9500 TC99M SESTAMIBI: HCPCS

## 2024-03-21 PROCEDURE — 93018 CV STRESS TEST I&R ONLY: CPT | Performed by: STUDENT IN AN ORGANIZED HEALTH CARE EDUCATION/TRAINING PROGRAM

## 2024-03-21 PROCEDURE — 78452 HT MUSCLE IMAGE SPECT MULT: CPT

## 2024-03-21 PROCEDURE — 343N000001 HC RX 343

## 2024-03-21 PROCEDURE — 93016 CV STRESS TEST SUPVJ ONLY: CPT | Performed by: STUDENT IN AN ORGANIZED HEALTH CARE EDUCATION/TRAINING PROGRAM

## 2024-03-21 PROCEDURE — 250N000011 HC RX IP 250 OP 636: Mod: JZ | Performed by: STUDENT IN AN ORGANIZED HEALTH CARE EDUCATION/TRAINING PROGRAM

## 2024-03-21 PROCEDURE — 93017 CV STRESS TEST TRACING ONLY: CPT

## 2024-03-21 RX ORDER — LISINOPRIL 20 MG/1
1 TABLET ORAL
COMMUNITY
Start: 2024-01-08

## 2024-03-21 RX ORDER — REGADENOSON 0.08 MG/ML
0.4 INJECTION, SOLUTION INTRAVENOUS ONCE
Status: COMPLETED | OUTPATIENT
Start: 2024-03-21 | End: 2024-03-21

## 2024-03-21 RX ADMIN — KIT FOR THE PREPARATION OF TECHNETIUM TC99M SESTAMIBI 7.92 MILLICURIE: 1 INJECTION, POWDER, LYOPHILIZED, FOR SOLUTION PARENTERAL at 07:10

## 2024-03-21 RX ADMIN — KIT FOR THE PREPARATION OF TECHNETIUM TC99M SESTAMIBI 33.5 MILLICURIE: 1 INJECTION, POWDER, LYOPHILIZED, FOR SOLUTION PARENTERAL at 08:50

## 2024-03-21 RX ADMIN — REGADENOSON 0.4 MG: 0.08 INJECTION, SOLUTION INTRAVENOUS at 08:48

## 2024-03-21 NOTE — PROGRESS NOTES
0700 The patient arrived for a Lexiscan Cardiolite stress test. Patient denies SOB and Sa02 97%.  The procedure, risks, and benefits were discussed with the patient ,and the consent was signed.  A saline lock was started,and the Cardiolite was injected by Nuclear Medicine.  The patient was taken to the waiting area, to await resting images at 0715.  0840 The patient returned from Nuclear Medicine and was prepped for the stress test.   Dr. Daigle arrived, and the patient was administered the Lexiscan per procedure.  The patient tolerated the procedure  He was given a snack and taken to Nuclear Medicine in stable condition for stress images.  The saline lock will be removed by Nuclear Medicine for proper disposal.  The patient was instructed that the ordering MD will call with results in one to two days.  Please see the chart for the complete test results.

## 2024-05-17 ENCOUNTER — MEDICAL CORRESPONDENCE (OUTPATIENT)
Dept: HEALTH INFORMATION MANAGEMENT | Facility: OTHER | Age: 69
End: 2024-05-17

## 2024-07-09 DIAGNOSIS — M18.12 PRIMARY OSTEOARTHRITIS OF FIRST CARPOMETACARPAL JOINT OF LEFT HAND: Primary | ICD-10-CM

## 2024-07-10 ENCOUNTER — HOSPITAL ENCOUNTER (OUTPATIENT)
Dept: GENERAL RADIOLOGY | Facility: OTHER | Age: 69
Discharge: HOME | End: 2024-07-10
Attending: ORTHOPAEDIC SURGERY
Payer: COMMERCIAL

## 2024-07-10 ENCOUNTER — OFFICE VISIT (OUTPATIENT)
Dept: ORTHOPEDICS | Facility: OTHER | Age: 69
End: 2024-07-10
Attending: ORTHOPAEDIC SURGERY
Payer: COMMERCIAL

## 2024-07-10 VITALS — HEART RATE: 59 BPM | OXYGEN SATURATION: 96 %

## 2024-07-10 DIAGNOSIS — M18.12 PRIMARY OSTEOARTHRITIS OF FIRST CARPOMETACARPAL JOINT OF LEFT HAND: ICD-10-CM

## 2024-07-10 DIAGNOSIS — M18.12 PRIMARY OSTEOARTHRITIS OF FIRST CARPOMETACARPAL JOINT OF LEFT HAND: Primary | ICD-10-CM

## 2024-07-10 PROCEDURE — 250N000009 HC RX 250: Mod: JW | Performed by: ORTHOPAEDIC SURGERY

## 2024-07-10 PROCEDURE — 73130 X-RAY EXAM OF HAND: CPT | Mod: LT

## 2024-07-10 PROCEDURE — 99203 OFFICE O/P NEW LOW 30 MIN: CPT | Mod: 25 | Performed by: ORTHOPAEDIC SURGERY

## 2024-07-10 PROCEDURE — 20600 DRAIN/INJ JOINT/BURSA W/O US: CPT | Mod: LT | Performed by: ORTHOPAEDIC SURGERY

## 2024-07-10 PROCEDURE — 250N000011 HC RX IP 250 OP 636: Mod: JW | Performed by: ORTHOPAEDIC SURGERY

## 2024-07-10 RX ORDER — LIDOCAINE HYDROCHLORIDE 10 MG/ML
0.5 INJECTION, SOLUTION EPIDURAL; INFILTRATION; INTRACAUDAL; PERINEURAL ONCE
Status: COMPLETED | OUTPATIENT
Start: 2024-07-10 | End: 2024-07-10

## 2024-07-10 RX ORDER — METHYLPREDNISOLONE ACETATE 40 MG/ML
20 INJECTION, SUSPENSION INTRA-ARTICULAR; INTRALESIONAL; INTRAMUSCULAR; SOFT TISSUE ONCE
Status: COMPLETED | OUTPATIENT
Start: 2024-07-10 | End: 2024-07-10

## 2024-07-10 RX ADMIN — METHYLPREDNISOLONE ACETATE 20 MG: 40 INJECTION, SUSPENSION INTRA-ARTICULAR; INTRALESIONAL; INTRAMUSCULAR; INTRASYNOVIAL; SOFT TISSUE at 10:44

## 2024-07-10 RX ADMIN — LIDOCAINE HYDROCHLORIDE 0.5 ML: 10 INJECTION, SOLUTION EPIDURAL; INFILTRATION; INTRACAUDAL; PERINEURAL at 10:44

## 2024-07-10 ASSESSMENT — PAIN SCALES - GENERAL: PAINLEVEL: MILD PAIN (3)

## 2024-07-10 NOTE — PROGRESS NOTES
Surgical Clinic Consult  Primary physician:     Jen Galvan    Chief complaint:   Left thumb pain    History of present illness:  This is a 68 year old male I am seeing in consultation for chronic left thumb pain that got significantly worse after having prior carpal tunnel surgery.  Carpal tunnel surgery was done about 1 year back.  No injections have been done at this point.  Patient has not done anything in terms of bracing as well.  Patient is otherwise right-hand dominant and does have some upcoming surgery on his carpal tunnel there potentially but is wants to wait until his left hand is actually feeling better for him.  No trauma prior to onset.    Past medical history:   No past medical history on file.    Pastsurgical history:  Past Surgical History:   Procedure Laterality Date    COLONOSCOPY N/A 9/23/2019    F/U 2024 serrated adenoma       Current medications:  Current Outpatient Medications   Medication Sig Dispense Refill    artificial tears OINT ophthalmic ointment Place into both eyes nightly as needed       carboxymethylcellulose (CELLUVISC/REFRESH LIQUIGEL) 1 % ophthalmic solution Place 1 drop into both eyes 3 times daily as needed       fluticasone (FLONASE) 50 MCG/ACT spray Spray 2 sprays into both nostrils daily       HYDROcodone-acetaminophen (NORCO) 5-325 MG tablet Take 1 tablet by mouth every 6 hours as needed for pain 12 tablet 0    hydrOXYzine ziyad (VISTARIL) 25 MG capsule Take 1 capsule (25 mg) by mouth 4 times daily as needed for itching 12 capsule 0    hypromellose (GENTEAL) 0.3 % SOLN ophthalmic solution Place 1 drop into both eyes every hour as needed for dry eyes       lisinopril (ZESTRIL) 20 MG tablet Take 1 tablet by mouth daily at 2 pm      loratadine (CLARITIN) 10 MG tablet Take 10 mg by mouth daily      magnesium oxide 400 MG CAPS Take 1 capsule by mouth daily      methylPREDNISolone (MEDROL DOSEPAK) 4 MG tablet therapy pack Follow Package Directions 21 tablet 0    metoprolol  tartrate (LOPRESSOR) 25 MG tablet Take 1 tablet (25 mg) by mouth 2 times daily 180 tablet 3    NONFORMULARY Take 3 capsules by mouth daily Heal and soothe  Systemic Multi-Enzyme Therapy (SMET)      pantoprazole (PROTONIX) 40 MG EC tablet Take 40 mg by mouth daily      rOPINIRole (REQUIP) 1 MG tablet Take 1 mg by mouth daily as needed          Allergies:  Allergies   Allergen Reactions    Seasonal Allergies      Other reaction(s): Other - Describe In Comment Field  SEASONAL, Reaction: runny nose and watery eyes       Family history:  Family History   Problem Relation Age of Onset    No Known Problems Other         No known history of bleeding or clotting disorders in the family       Social history:  Social History     Socioeconomic History    Marital status: Single     Spouse name: Not on file    Number of children: Not on file    Years of education: Not on file    Highest education level: Not on file   Occupational History    Not on file   Tobacco Use    Smoking status: Former     Current packs/day: 0.00     Types: Cigarettes     Quit date: 1996     Years since quittin.8    Smokeless tobacco: Former   Substance and Sexual Activity    Alcohol use: Yes     Alcohol/week: 1.0 standard drink of alcohol     Types: 1 Cans of beer per week     Comment:  occasional - 4 drink per week    Drug use: No    Sexual activity: Not on file   Other Topics Concern    Parent/sibling w/ CABG, MI or angioplasty before 65F 55M? Not Asked   Social History Narrative    [facility], works at a local [employer], lives independently.     Social Determinants of Health     Financial Resource Strain: Not on file   Food Insecurity: Not on file   Transportation Needs: Not on file   Physical Activity: Not on file   Stress: Not on file   Social Connections: Not on file   Interpersonal Safety: Not on file   Housing Stability: Not on file       PROBLEM LIST:  Patient Active Problem List   Diagnosis    Environmental  allergies    Chronic hepatitis B virus infection (H)    H/O adenomatous polyp of colon    Ulceration of transverse colon    Allergic rhinitis    Benign neoplasm of colon    Depressive disorder    Essential hypertension    Gastroesophageal reflux disease    Hearing loss    Herpes simplex without complication    Obstructive sleep apnea syndrome    Osteoarthritis    Presbyopia    Restless legs    Other chest pain    Abnormal stress test on 5/14/2019    Morbid obesity (H)    History of pulmonary embolism on 12/9/2019    Chronic anticoagulation    History of tobacco abuse on 9/23/1996    Irregular heart rate    Amblyopia    Hemorrhoids    Hammer toe    Hypermetropia    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    Physical exam: Pulse 59   SpO2 96%     General: this is a pleasant male patient in no acute distress.  Patient is awake alert and oriented x3 .   EXAM:  Chest/Respiratory Exam: Normal - Clear to auscultation without rales, rhonchi, or wheezing.  Cardiovascular Exam: normal  Musculoskeletal: Left thumb shows pain with CMC grind test.  Swelling is present there as well.  Neuro exam otherwise intact.  Negative Finkelstein test noted.    PROCEDURE NOTE: Left thumb CMC joint injected with half cc of 1% lidocaine and 20 mg of Depo-Medrol under sterile conditions.  Patient did tolerate the procedure well.    Imaging: 3 views left thumb show severe arthrosis CMC joint.    Assessment:   Left thumb CMC arthritis    Plan:    Injection as stated above repeat in the future as necessary.  Long-term management CMC arthroplasty.      Sherif De La Fuente MD

## 2024-10-09 ENCOUNTER — MEDICAL CORRESPONDENCE (OUTPATIENT)
Dept: HEALTH INFORMATION MANAGEMENT | Facility: OTHER | Age: 69
End: 2024-10-09

## 2024-10-24 ENCOUNTER — HOSPITAL ENCOUNTER (OUTPATIENT)
Dept: GENERAL RADIOLOGY | Facility: OTHER | Age: 69
Discharge: HOME | End: 2024-10-24
Attending: NURSE PRACTITIONER | Admitting: NURSE PRACTITIONER
Payer: COMMERCIAL

## 2024-10-24 DIAGNOSIS — M79.671 PAIN IN RIGHT FOOT: ICD-10-CM

## 2024-10-24 PROCEDURE — 73630 X-RAY EXAM OF FOOT: CPT | Mod: RT

## 2024-12-28 ENCOUNTER — HEALTH MAINTENANCE LETTER (OUTPATIENT)
Age: 69
End: 2024-12-28

## (undated) DEVICE — TUBING SUCTION 10'X3/16" N510

## (undated) DEVICE — ENDO TRAP POLYP E-TRAP 00711099

## (undated) DEVICE — ENDO NDL INJECTION DISP NM-200U-0423

## (undated) DEVICE — SYR ENDO MARKER SPOT GI 5ML GIS-45

## (undated) DEVICE — ENDO SNARE POLYPECTOMY OVAL 15MM LOOP SD-240U-15

## (undated) DEVICE — ENDO BRUSH CHANNEL MASTER CLEANING 2-4.2MM BW-412T

## (undated) DEVICE — ENDO KIT COMPLIANCE DYKENDOCMPLY

## (undated) DEVICE — SUCTION MANIFOLD NEPTUNE 2 SYS 4 PORT 0702-020-000

## (undated) DEVICE — SNARE

## (undated) DEVICE — SOL WATER 1500ML

## (undated) DEVICE — ESU GROUND PAD ADULT W/CORD E7507

## (undated) RX ORDER — METHYLPREDNISOLONE ACETATE 40 MG/ML
INJECTION, SUSPENSION INTRA-ARTICULAR; INTRALESIONAL; INTRAMUSCULAR; SOFT TISSUE
Status: DISPENSED
Start: 2024-07-10

## (undated) RX ORDER — LIDOCAINE HYDROCHLORIDE 20 MG/ML
INJECTION, SOLUTION EPIDURAL; INFILTRATION; INTRACAUDAL; PERINEURAL
Status: DISPENSED
Start: 2018-07-18

## (undated) RX ORDER — SODIUM CHLORIDE 9 MG/ML
INJECTION, SOLUTION INTRAVENOUS
Status: DISPENSED
Start: 2019-12-09

## (undated) RX ORDER — PROPOFOL 10 MG/ML
INJECTION, EMULSION INTRAVENOUS
Status: DISPENSED
Start: 2019-09-23

## (undated) RX ORDER — ASPIRIN 81 MG/1
TABLET, CHEWABLE ORAL
Status: DISPENSED
Start: 2019-12-09

## (undated) RX ORDER — REGADENOSON 0.08 MG/ML
INJECTION, SOLUTION INTRAVENOUS
Status: DISPENSED
Start: 2024-03-21

## (undated) RX ORDER — FENTANYL CITRATE 50 UG/ML
INJECTION, SOLUTION INTRAMUSCULAR; INTRAVENOUS
Status: DISPENSED
Start: 2019-12-09

## (undated) RX ORDER — CLONIDINE HYDROCHLORIDE 0.1 MG/1
TABLET ORAL
Status: DISPENSED
Start: 2018-07-18

## (undated) RX ORDER — LIDOCAINE HYDROCHLORIDE 20 MG/ML
INJECTION, SOLUTION EPIDURAL; INFILTRATION; INTRACAUDAL; PERINEURAL
Status: DISPENSED
Start: 2019-09-23

## (undated) RX ORDER — ACETAMINOPHEN 500 MG
TABLET ORAL
Status: DISPENSED
Start: 2021-11-07

## (undated) RX ORDER — SODIUM CHLORIDE 9 MG/ML
INJECTION, SOLUTION INTRAVENOUS
Status: DISPENSED
Start: 2020-04-08

## (undated) RX ORDER — LIDOCAINE HYDROCHLORIDE 10 MG/ML
INJECTION, SOLUTION EPIDURAL; INFILTRATION; INTRACAUDAL; PERINEURAL
Status: DISPENSED
Start: 2024-07-10